# Patient Record
Sex: FEMALE | Race: WHITE | Employment: FULL TIME | URBAN - METROPOLITAN AREA
[De-identification: names, ages, dates, MRNs, and addresses within clinical notes are randomized per-mention and may not be internally consistent; named-entity substitution may affect disease eponyms.]

---

## 2019-12-27 ENCOUNTER — APPOINTMENT (OUTPATIENT)
Dept: CT IMAGING | Age: 69
DRG: 193 | End: 2019-12-27
Attending: EMERGENCY MEDICINE
Payer: COMMERCIAL

## 2019-12-27 ENCOUNTER — APPOINTMENT (OUTPATIENT)
Dept: GENERAL RADIOLOGY | Age: 69
DRG: 193 | End: 2019-12-27
Attending: EMERGENCY MEDICINE
Payer: COMMERCIAL

## 2019-12-27 ENCOUNTER — HOSPITAL ENCOUNTER (INPATIENT)
Age: 69
LOS: 4 days | Discharge: HOME | DRG: 193 | End: 2019-12-31
Attending: EMERGENCY MEDICINE | Admitting: INTERNAL MEDICINE
Payer: COMMERCIAL

## 2019-12-27 DIAGNOSIS — R09.02 HYPOXEMIA: ICD-10-CM

## 2019-12-27 DIAGNOSIS — J18.9 PNEUMONIA OF BOTH LUNGS DUE TO INFECTIOUS ORGANISM, UNSPECIFIED PART OF LUNG: Primary | ICD-10-CM

## 2019-12-27 DIAGNOSIS — C90.00 MULTIPLE MYELOMA NOT HAVING ACHIEVED REMISSION (HCC): ICD-10-CM

## 2019-12-27 DIAGNOSIS — J11.1 INFLUENZA: ICD-10-CM

## 2019-12-27 LAB
ALBUMIN SERPL-MCNC: 2.2 G/DL (ref 3.5–5)
ALBUMIN/GLOB SERPL: 0.3 {RATIO} (ref 1.1–2.2)
ALP SERPL-CCNC: 100 U/L (ref 45–117)
ALT SERPL-CCNC: 60 U/L (ref 12–78)
ANION GAP SERPL CALC-SCNC: 10 MMOL/L (ref 5–15)
AST SERPL-CCNC: 64 U/L (ref 15–37)
BASOPHILS # BLD: 0 K/UL (ref 0–0.1)
BASOPHILS NFR BLD: 0 % (ref 0–1)
BILIRUB SERPL-MCNC: 1.1 MG/DL (ref 0.2–1)
BUN SERPL-MCNC: 19 MG/DL (ref 6–20)
BUN/CREAT SERPL: 17 (ref 12–20)
CALCIUM SERPL-MCNC: 7.4 MG/DL (ref 8.5–10.1)
CHLORIDE SERPL-SCNC: 102 MMOL/L (ref 97–108)
CO2 SERPL-SCNC: 22 MMOL/L (ref 21–32)
COMMENT, HOLDF: NORMAL
CREAT SERPL-MCNC: 1.14 MG/DL (ref 0.55–1.02)
DIFFERENTIAL METHOD BLD: ABNORMAL
EOSINOPHIL # BLD: 0 K/UL (ref 0–0.4)
EOSINOPHIL NFR BLD: 0 % (ref 0–7)
ERYTHROCYTE [DISTWIDTH] IN BLOOD BY AUTOMATED COUNT: 14.5 % (ref 11.5–14.5)
GLOBULIN SER CALC-MCNC: 6.5 G/DL (ref 2–4)
GLUCOSE SERPL-MCNC: 151 MG/DL (ref 65–100)
HCT VFR BLD AUTO: 29.5 % (ref 35–47)
HGB BLD-MCNC: 10 G/DL (ref 11.5–16)
IMM GRANULOCYTES # BLD AUTO: 0 K/UL
IMM GRANULOCYTES NFR BLD AUTO: 0 %
LACTATE BLD-SCNC: 1.47 MMOL/L (ref 0.4–2)
LYMPHOCYTES # BLD: 0.7 K/UL (ref 0.8–3.5)
LYMPHOCYTES NFR BLD: 11 % (ref 12–49)
MCH RBC QN AUTO: 32.5 PG (ref 26–34)
MCHC RBC AUTO-ENTMCNC: 33.9 G/DL (ref 30–36.5)
MCV RBC AUTO: 95.8 FL (ref 80–99)
MONOCYTES # BLD: 0.4 K/UL (ref 0–1)
MONOCYTES NFR BLD: 6 % (ref 5–13)
NEUTS BAND NFR BLD MANUAL: 6 % (ref 0–6)
NEUTS SEG # BLD: 5.3 K/UL (ref 1.8–8)
NEUTS SEG NFR BLD: 77 % (ref 32–75)
NRBC # BLD: 0.03 K/UL (ref 0–0.01)
NRBC BLD-RTO: 0.5 PER 100 WBC
PLATELET # BLD AUTO: 231 K/UL (ref 150–400)
PMV BLD AUTO: 11 FL (ref 8.9–12.9)
POTASSIUM SERPL-SCNC: 3 MMOL/L (ref 3.5–5.1)
PROT SERPL-MCNC: 8.7 G/DL (ref 6.4–8.2)
RBC # BLD AUTO: 3.08 M/UL (ref 3.8–5.2)
RBC MORPH BLD: ABNORMAL
SAMPLES BEING HELD,HOLD: NORMAL
SODIUM SERPL-SCNC: 134 MMOL/L (ref 136–145)
WBC # BLD AUTO: 6.4 K/UL (ref 3.6–11)

## 2019-12-27 PROCEDURE — 77030013140 HC MSK NEB VYRM -A

## 2019-12-27 PROCEDURE — 71045 X-RAY EXAM CHEST 1 VIEW: CPT

## 2019-12-27 PROCEDURE — 99285 EMERGENCY DEPT VISIT HI MDM: CPT

## 2019-12-27 PROCEDURE — 96375 TX/PRO/DX INJ NEW DRUG ADDON: CPT

## 2019-12-27 PROCEDURE — 96374 THER/PROPH/DIAG INJ IV PUSH: CPT

## 2019-12-27 PROCEDURE — 65660000000 HC RM CCU STEPDOWN

## 2019-12-27 PROCEDURE — 80053 COMPREHEN METABOLIC PANEL: CPT

## 2019-12-27 PROCEDURE — 87040 BLOOD CULTURE FOR BACTERIA: CPT

## 2019-12-27 PROCEDURE — 94640 AIRWAY INHALATION TREATMENT: CPT

## 2019-12-27 PROCEDURE — 74011000250 HC RX REV CODE- 250: Performed by: EMERGENCY MEDICINE

## 2019-12-27 PROCEDURE — 74011250637 HC RX REV CODE- 250/637: Performed by: EMERGENCY MEDICINE

## 2019-12-27 PROCEDURE — 71275 CT ANGIOGRAPHY CHEST: CPT

## 2019-12-27 PROCEDURE — 85025 COMPLETE CBC W/AUTO DIFF WBC: CPT

## 2019-12-27 PROCEDURE — 36415 COLL VENOUS BLD VENIPUNCTURE: CPT

## 2019-12-27 PROCEDURE — 93005 ELECTROCARDIOGRAM TRACING: CPT

## 2019-12-27 PROCEDURE — 74011250636 HC RX REV CODE- 250/636: Performed by: EMERGENCY MEDICINE

## 2019-12-27 PROCEDURE — 83605 ASSAY OF LACTIC ACID: CPT

## 2019-12-27 PROCEDURE — 74011636320 HC RX REV CODE- 636/320: Performed by: RADIOLOGY

## 2019-12-27 RX ORDER — LEVOFLOXACIN 5 MG/ML
750 INJECTION, SOLUTION INTRAVENOUS EVERY 24 HOURS
Status: DISCONTINUED | OUTPATIENT
Start: 2019-12-27 | End: 2019-12-28 | Stop reason: SDUPTHER

## 2019-12-27 RX ORDER — LENALIDOMIDE 25 MG/1
25 CAPSULE ORAL
COMMUNITY

## 2019-12-27 RX ORDER — ASPIRIN 81 MG/1
81 TABLET ORAL DAILY
COMMUNITY

## 2019-12-27 RX ORDER — CALCIUM CARBONATE/VITAMIN D3 250-3.125
1 TABLET ORAL 2 TIMES DAILY
COMMUNITY

## 2019-12-27 RX ORDER — ALBUTEROL SULFATE 0.83 MG/ML
5 SOLUTION RESPIRATORY (INHALATION)
Status: COMPLETED | OUTPATIENT
Start: 2019-12-27 | End: 2019-12-27

## 2019-12-27 RX ORDER — ALLOPURINOL 100 MG/1
100 TABLET ORAL DAILY
COMMUNITY

## 2019-12-27 RX ORDER — ACETAMINOPHEN 500 MG
1000 TABLET ORAL ONCE
Status: COMPLETED | OUTPATIENT
Start: 2019-12-27 | End: 2019-12-27

## 2019-12-27 RX ORDER — DEXAMETHASONE 4 MG/1
20 TABLET ORAL
COMMUNITY

## 2019-12-27 RX ORDER — ACYCLOVIR 400 MG/1
400 TABLET ORAL 2 TIMES DAILY
COMMUNITY

## 2019-12-27 RX ORDER — SODIUM CHLORIDE 0.9 % (FLUSH) 0.9 %
5-10 SYRINGE (ML) INJECTION AS NEEDED
Status: DISCONTINUED | OUTPATIENT
Start: 2019-12-27 | End: 2019-12-31 | Stop reason: HOSPADM

## 2019-12-27 RX ORDER — OMEPRAZOLE 20 MG/1
20 CAPSULE, DELAYED RELEASE ORAL
COMMUNITY

## 2019-12-27 RX ORDER — IPRATROPIUM BROMIDE AND ALBUTEROL SULFATE 2.5; .5 MG/3ML; MG/3ML
3 SOLUTION RESPIRATORY (INHALATION)
Status: COMPLETED | OUTPATIENT
Start: 2019-12-27 | End: 2019-12-27

## 2019-12-27 RX ADMIN — METHYLPREDNISOLONE SODIUM SUCCINATE 125 MG: 125 INJECTION, POWDER, FOR SOLUTION INTRAMUSCULAR; INTRAVENOUS at 19:30

## 2019-12-27 RX ADMIN — IOPAMIDOL 75 ML: 755 INJECTION, SOLUTION INTRAVENOUS at 18:39

## 2019-12-27 RX ADMIN — ALBUTEROL SULFATE 5 MG: 2.5 SOLUTION RESPIRATORY (INHALATION) at 19:35

## 2019-12-27 RX ADMIN — ACETAMINOPHEN 1000 MG: 500 TABLET ORAL at 16:05

## 2019-12-27 RX ADMIN — SODIUM CHLORIDE 1000 ML: 900 INJECTION, SOLUTION INTRAVENOUS at 18:05

## 2019-12-27 RX ADMIN — LEVOFLOXACIN 750 MG: 5 INJECTION, SOLUTION INTRAVENOUS at 19:43

## 2019-12-27 RX ADMIN — SODIUM CHLORIDE 1000 ML: 900 INJECTION, SOLUTION INTRAVENOUS at 16:06

## 2019-12-27 RX ADMIN — IPRATROPIUM BROMIDE AND ALBUTEROL SULFATE 3 ML: .5; 3 SOLUTION RESPIRATORY (INHALATION) at 16:05

## 2019-12-27 NOTE — ED TRIAGE NOTES
Arrival via EMS, bearing down to have a BM and passed out. A/O x 4 when EMS arrived. Over last couple of weeks running fever, feeling like she is coming down with something. Receiving chemo for multiple myeloma. Shortness of breath noted upon arrival here, pulse ox 90% on room air. Per EMS 92% on room air up to 98% on 4 L/NC.

## 2019-12-28 ENCOUNTER — APPOINTMENT (OUTPATIENT)
Dept: NON INVASIVE DIAGNOSTICS | Age: 69
DRG: 193 | End: 2019-12-28
Attending: INTERNAL MEDICINE
Payer: COMMERCIAL

## 2019-12-28 PROBLEM — D64.9 ANEMIA: Status: ACTIVE | Noted: 2019-12-28

## 2019-12-28 PROBLEM — R09.02 HYPOXEMIA: Status: ACTIVE | Noted: 2019-12-28

## 2019-12-28 PROBLEM — E88.09 HYPOALBUMINEMIA: Status: ACTIVE | Noted: 2019-12-28

## 2019-12-28 PROBLEM — E66.9 OBESE: Status: ACTIVE | Noted: 2019-12-28

## 2019-12-28 PROBLEM — I95.9 HYPOTENSION: Status: ACTIVE | Noted: 2019-12-28

## 2019-12-28 PROBLEM — J18.9 BILATERAL PNEUMONIA: Status: RESOLVED | Noted: 2019-12-27 | Resolved: 2019-12-28

## 2019-12-28 PROBLEM — R93.89 ABNORMAL X-RAY: Status: ACTIVE | Noted: 2019-12-28

## 2019-12-28 PROBLEM — R79.89 LOW TSH LEVEL: Status: ACTIVE | Noted: 2019-12-28

## 2019-12-28 PROBLEM — R55 SYNCOPE: Status: ACTIVE | Noted: 2019-12-28

## 2019-12-28 LAB
ALBUMIN SERPL-MCNC: 2.1 G/DL (ref 3.5–5)
ALBUMIN/GLOB SERPL: 0.4 {RATIO} (ref 1.1–2.2)
ALP SERPL-CCNC: 82 U/L (ref 45–117)
ALT SERPL-CCNC: 53 U/L (ref 12–78)
ANION GAP SERPL CALC-SCNC: 10 MMOL/L (ref 5–15)
APPEARANCE UR: CLEAR
AST SERPL-CCNC: 43 U/L (ref 15–37)
B PERT DNA SPEC QL NAA+PROBE: NOT DETECTED
BACTERIA URNS QL MICRO: ABNORMAL /HPF
BASOPHILS # BLD: 0 K/UL (ref 0–0.1)
BASOPHILS NFR BLD: 0 % (ref 0–1)
BILIRUB SERPL-MCNC: 0.6 MG/DL (ref 0.2–1)
BILIRUB UR QL: NEGATIVE
BORDETELLA PARAPERTUSSIS PCR, BORPAR: NOT DETECTED
BUN SERPL-MCNC: 18 MG/DL (ref 6–20)
BUN/CREAT SERPL: 19 (ref 12–20)
C PNEUM DNA SPEC QL NAA+PROBE: NOT DETECTED
CALCIUM SERPL-MCNC: 6.6 MG/DL (ref 8.5–10.1)
CHLORIDE SERPL-SCNC: 106 MMOL/L (ref 97–108)
CO2 SERPL-SCNC: 19 MMOL/L (ref 21–32)
COLOR UR: ABNORMAL
CREAT SERPL-MCNC: 0.96 MG/DL (ref 0.55–1.02)
CREAT UR-MCNC: 86.8 MG/DL
DIFFERENTIAL METHOD BLD: ABNORMAL
ECHO AO ASC DIAM: 3.38 CM
ECHO AO ROOT DIAM: 2.69 CM
ECHO IVC SNIFF: 2.36 CM
ECHO LA AREA 4C: 26.4 CM2
ECHO LA MAJOR AXIS: 4.86 CM
ECHO LA TO AORTIC ROOT RATIO: 1.8
ECHO LA VOL 2C: 79.69 ML (ref 22–52)
ECHO LA VOL 4C: 83.7 ML (ref 22–52)
ECHO LA VOL BP: 83.1 ML (ref 22–52)
ECHO LA VOL/BSA BIPLANE: 40.67 ML/M2 (ref 16–28)
ECHO LA VOLUME INDEX A2C: 39 ML/M2 (ref 16–28)
ECHO LA VOLUME INDEX A4C: 40.96 ML/M2 (ref 16–28)
ECHO LV E' LATERAL VELOCITY: 9.51 CENTIMETER/SECOND
ECHO LV E' SEPTAL VELOCITY: 7.53 CENTIMETER/SECOND
ECHO LV EDV TEICHHOLZ: 0.8 ML
ECHO LV ESV TEICHHOLZ: 0.34 ML
ECHO LV INTERNAL DIMENSION DIASTOLIC: 5.3 CM (ref 3.9–5.3)
ECHO LV INTERNAL DIMENSION SYSTOLIC: 3.71 CM
ECHO LV IVSD: 1.06 CM (ref 0.6–0.9)
ECHO LV MASS 2D: 283.9 G (ref 67–162)
ECHO LV MASS INDEX 2D: 138.9 G/M2 (ref 43–95)
ECHO LV POSTERIOR WALL DIASTOLIC: 1.22 CM (ref 0.6–0.9)
ECHO LVOT DIAM: 1.89 CM
ECHO MV A VELOCITY: 79.4 CM/S
ECHO MV AREA PHT: 4.9 CM2
ECHO MV E DECELERATION TIME (DT): 155.1 MS
ECHO MV E VELOCITY: 115.21 CM/S
ECHO MV E/A RATIO: 1.45
ECHO MV PRESSURE HALF TIME (PHT): 45 MS
ECHO RA AREA 4C: 16.95 CM2
ECHO RV INTERNAL DIMENSION: 4.14 CM
ECHO RV TAPSE: 2.5 CM (ref 1.5–2)
EOSINOPHIL # BLD: 0 K/UL (ref 0–0.4)
EOSINOPHIL #/AREA URNS HPF: NEGATIVE /[HPF]
EOSINOPHIL NFR BLD: 0 % (ref 0–7)
EPITH CASTS URNS QL MICRO: ABNORMAL /LPF
ERYTHROCYTE [DISTWIDTH] IN BLOOD BY AUTOMATED COUNT: 14.6 % (ref 11.5–14.5)
FERRITIN SERPL-MCNC: 2101 NG/ML (ref 8–252)
FLUAV H1 2009 PAND RNA SPEC QL NAA+PROBE: DETECTED
FLUAV H1 RNA SPEC QL NAA+PROBE: NOT DETECTED
FLUAV H3 RNA SPEC QL NAA+PROBE: NOT DETECTED
FLUAV SUBTYP SPEC NAA+PROBE: NOT DETECTED
FLUBV RNA SPEC QL NAA+PROBE: NOT DETECTED
FOLATE SERPL-MCNC: 19.9 NG/ML (ref 5–21)
GLOBULIN SER CALC-MCNC: 5.4 G/DL (ref 2–4)
GLUCOSE SERPL-MCNC: 239 MG/DL (ref 65–100)
GLUCOSE UR STRIP.AUTO-MCNC: NEGATIVE MG/DL
HADV DNA SPEC QL NAA+PROBE: NOT DETECTED
HAPTOGLOB SERPL-MCNC: 357 MG/DL (ref 30–200)
HCOV 229E RNA SPEC QL NAA+PROBE: NOT DETECTED
HCOV HKU1 RNA SPEC QL NAA+PROBE: NOT DETECTED
HCOV NL63 RNA SPEC QL NAA+PROBE: NOT DETECTED
HCOV OC43 RNA SPEC QL NAA+PROBE: NOT DETECTED
HCT VFR BLD AUTO: 26.7 % (ref 35–47)
HGB BLD-MCNC: 9.1 G/DL (ref 11.5–16)
HGB UR QL STRIP: NEGATIVE
HMPV RNA SPEC QL NAA+PROBE: NOT DETECTED
HPIV1 RNA SPEC QL NAA+PROBE: NOT DETECTED
HPIV2 RNA SPEC QL NAA+PROBE: NOT DETECTED
HPIV3 RNA SPEC QL NAA+PROBE: NOT DETECTED
HPIV4 RNA SPEC QL NAA+PROBE: NOT DETECTED
HYALINE CASTS URNS QL MICRO: ABNORMAL /LPF (ref 0–5)
IMM GRANULOCYTES # BLD AUTO: 0 K/UL
IMM GRANULOCYTES NFR BLD AUTO: 0 %
IRON SATN MFR SERPL: 8 % (ref 20–50)
IRON SERPL-MCNC: 14 UG/DL (ref 35–150)
KETONES UR QL STRIP.AUTO: NEGATIVE MG/DL
LDH SERPL L TO P-CCNC: 400 U/L (ref 81–246)
LEUKOCYTE ESTERASE UR QL STRIP.AUTO: ABNORMAL
LVFS 2D: 30.02 %
LVSV (TEICH): 37.04 ML
LYMPHOCYTES # BLD: 0.1 K/UL (ref 0.8–3.5)
LYMPHOCYTES NFR BLD: 3 % (ref 12–49)
M PNEUMO DNA SPEC QL NAA+PROBE: NOT DETECTED
MCH RBC QN AUTO: 32.4 PG (ref 26–34)
MCHC RBC AUTO-ENTMCNC: 34.1 G/DL (ref 30–36.5)
MCV RBC AUTO: 95 FL (ref 80–99)
METAMYELOCYTES NFR BLD MANUAL: 1 %
MONOCYTES # BLD: 0.1 K/UL (ref 0–1)
MONOCYTES NFR BLD: 2 % (ref 5–13)
MV DEC SLOPE: 7.43
NEUTS SEG # BLD: 3.7 K/UL (ref 1.8–8)
NEUTS SEG NFR BLD: 94 % (ref 32–75)
NITRITE UR QL STRIP.AUTO: NEGATIVE
NRBC # BLD: 0 K/UL (ref 0–0.01)
NRBC BLD-RTO: 0 PER 100 WBC
OSMOLALITY UR: 534 MOSM/KG H2O
PH UR STRIP: 5.5 [PH] (ref 5–8)
PLATELET # BLD AUTO: 207 K/UL (ref 150–400)
PMV BLD AUTO: 10.7 FL (ref 8.9–12.9)
POTASSIUM SERPL-SCNC: 3.2 MMOL/L (ref 3.5–5.1)
PROCALCITONIN SERPL-MCNC: 0.75 NG/ML
PROT SERPL-MCNC: 7.5 G/DL (ref 6.4–8.2)
PROT UR STRIP-MCNC: 30 MG/DL
PROT UR-MCNC: 95 MG/DL (ref 0–11.9)
RBC # BLD AUTO: 2.81 M/UL (ref 3.8–5.2)
RBC #/AREA URNS HPF: ABNORMAL /HPF (ref 0–5)
RBC MORPH BLD: ABNORMAL
RETICS # AUTO: 0.03 M/UL (ref 0.02–0.08)
RETICS/RBC NFR AUTO: 1.2 % (ref 0.7–2.1)
RSV RNA SPEC QL NAA+PROBE: NOT DETECTED
RV+EV RNA SPEC QL NAA+PROBE: NOT DETECTED
SODIUM SERPL-SCNC: 135 MMOL/L (ref 136–145)
SODIUM UR-SCNC: 18 MMOL/L
SP GR UR REFRACTOMETRY: 1.03 (ref 1–1.03)
T4 SERPL-MCNC: 10.1 UG/DL (ref 4.8–13.9)
TIBC SERPL-MCNC: 184 UG/DL (ref 250–450)
TROPONIN I SERPL-MCNC: <0.05 NG/ML
TROPONIN I SERPL-MCNC: <0.05 NG/ML
TSH SERPL DL<=0.05 MIU/L-ACNC: 0.04 UIU/ML (ref 0.36–3.74)
UR CULT HOLD, URHOLD: NORMAL
UROBILINOGEN UR QL STRIP.AUTO: 0.2 EU/DL (ref 0.2–1)
VIT B12 SERPL-MCNC: 724 PG/ML (ref 193–986)
WBC # BLD AUTO: 3.9 K/UL (ref 3.6–11)
WBC URNS QL MICRO: ABNORMAL /HPF (ref 0–4)

## 2019-12-28 PROCEDURE — 83540 ASSAY OF IRON: CPT

## 2019-12-28 PROCEDURE — 0099U RESPIRATORY PANEL,PCR,NASOPHARYNGEAL: CPT

## 2019-12-28 PROCEDURE — 87205 SMEAR GRAM STAIN: CPT

## 2019-12-28 PROCEDURE — 36415 COLL VENOUS BLD VENIPUNCTURE: CPT

## 2019-12-28 PROCEDURE — 85025 COMPLETE CBC W/AUTO DIFF WBC: CPT

## 2019-12-28 PROCEDURE — 77030038269 HC DRN EXT URIN PURWCK BARD -A

## 2019-12-28 PROCEDURE — 77030029684 HC NEB SM VOL KT MONA -A

## 2019-12-28 PROCEDURE — 81001 URINALYSIS AUTO W/SCOPE: CPT

## 2019-12-28 PROCEDURE — 83615 LACTATE (LD) (LDH) ENZYME: CPT

## 2019-12-28 PROCEDURE — 82746 ASSAY OF FOLIC ACID SERUM: CPT

## 2019-12-28 PROCEDURE — 84436 ASSAY OF TOTAL THYROXINE: CPT

## 2019-12-28 PROCEDURE — 84484 ASSAY OF TROPONIN QUANT: CPT

## 2019-12-28 PROCEDURE — 87899 AGENT NOS ASSAY W/OPTIC: CPT

## 2019-12-28 PROCEDURE — 74011000258 HC RX REV CODE- 258: Performed by: INTERNAL MEDICINE

## 2019-12-28 PROCEDURE — 82728 ASSAY OF FERRITIN: CPT

## 2019-12-28 PROCEDURE — 84443 ASSAY THYROID STIM HORMONE: CPT

## 2019-12-28 PROCEDURE — 94760 N-INVAS EAR/PLS OXIMETRY 1: CPT

## 2019-12-28 PROCEDURE — 83010 ASSAY OF HAPTOGLOBIN QUANT: CPT

## 2019-12-28 PROCEDURE — 65660000000 HC RM CCU STEPDOWN

## 2019-12-28 PROCEDURE — 84145 PROCALCITONIN (PCT): CPT

## 2019-12-28 PROCEDURE — 94640 AIRWAY INHALATION TREATMENT: CPT

## 2019-12-28 PROCEDURE — 84156 ASSAY OF PROTEIN URINE: CPT

## 2019-12-28 PROCEDURE — 74011000250 HC RX REV CODE- 250: Performed by: INTERNAL MEDICINE

## 2019-12-28 PROCEDURE — 77010033678 HC OXYGEN DAILY

## 2019-12-28 PROCEDURE — 82570 ASSAY OF URINE CREATININE: CPT

## 2019-12-28 PROCEDURE — 74011250636 HC RX REV CODE- 250/636: Performed by: INTERNAL MEDICINE

## 2019-12-28 PROCEDURE — 80053 COMPREHEN METABOLIC PANEL: CPT

## 2019-12-28 PROCEDURE — 85045 AUTOMATED RETICULOCYTE COUNT: CPT

## 2019-12-28 PROCEDURE — 84300 ASSAY OF URINE SODIUM: CPT

## 2019-12-28 PROCEDURE — 83935 ASSAY OF URINE OSMOLALITY: CPT

## 2019-12-28 PROCEDURE — 82607 VITAMIN B-12: CPT

## 2019-12-28 PROCEDURE — 74011250637 HC RX REV CODE- 250/637: Performed by: INTERNAL MEDICINE

## 2019-12-28 PROCEDURE — 87449 NOS EACH ORGANISM AG IA: CPT

## 2019-12-28 PROCEDURE — 93306 TTE W/DOPPLER COMPLETE: CPT

## 2019-12-28 PROCEDURE — 84480 ASSAY TRIIODOTHYRONINE (T3): CPT

## 2019-12-28 RX ORDER — POTASSIUM CHLORIDE AND SODIUM CHLORIDE 900; 300 MG/100ML; MG/100ML
INJECTION, SOLUTION INTRAVENOUS CONTINUOUS
Status: DISCONTINUED | OUTPATIENT
Start: 2019-12-28 | End: 2019-12-30

## 2019-12-28 RX ORDER — MORPHINE SULFATE 4 MG/ML
5 INJECTION INTRAVENOUS
Status: DISCONTINUED | OUTPATIENT
Start: 2019-12-28 | End: 2019-12-31 | Stop reason: HOSPADM

## 2019-12-28 RX ORDER — ALLOPURINOL 100 MG/1
100 TABLET ORAL DAILY
Status: DISCONTINUED | OUTPATIENT
Start: 2019-12-28 | End: 2019-12-31 | Stop reason: HOSPADM

## 2019-12-28 RX ORDER — DIPHENHYDRAMINE HCL 25 MG
25 CAPSULE ORAL
Status: DISCONTINUED | OUTPATIENT
Start: 2019-12-28 | End: 2019-12-31 | Stop reason: HOSPADM

## 2019-12-28 RX ORDER — OSELTAMIVIR PHOSPHATE 75 MG/1
75 CAPSULE ORAL 2 TIMES DAILY
Status: DISCONTINUED | OUTPATIENT
Start: 2019-12-28 | End: 2019-12-31 | Stop reason: HOSPADM

## 2019-12-28 RX ORDER — ACETAMINOPHEN 325 MG/1
650 TABLET ORAL
Status: DISCONTINUED | OUTPATIENT
Start: 2019-12-28 | End: 2019-12-31 | Stop reason: HOSPADM

## 2019-12-28 RX ORDER — ASPIRIN 81 MG/1
81 TABLET ORAL DAILY
Status: DISCONTINUED | OUTPATIENT
Start: 2019-12-28 | End: 2019-12-31 | Stop reason: HOSPADM

## 2019-12-28 RX ORDER — IPRATROPIUM BROMIDE AND ALBUTEROL SULFATE 2.5; .5 MG/3ML; MG/3ML
3 SOLUTION RESPIRATORY (INHALATION)
Status: DISCONTINUED | OUTPATIENT
Start: 2019-12-28 | End: 2019-12-28

## 2019-12-28 RX ORDER — PANTOPRAZOLE SODIUM 40 MG/1
40 TABLET, DELAYED RELEASE ORAL
Status: DISCONTINUED | OUTPATIENT
Start: 2019-12-28 | End: 2019-12-31 | Stop reason: HOSPADM

## 2019-12-28 RX ORDER — SODIUM CHLORIDE 0.9 % (FLUSH) 0.9 %
5-40 SYRINGE (ML) INJECTION EVERY 8 HOURS
Status: DISCONTINUED | OUTPATIENT
Start: 2019-12-28 | End: 2019-12-31 | Stop reason: HOSPADM

## 2019-12-28 RX ORDER — ENOXAPARIN SODIUM 100 MG/ML
40 INJECTION SUBCUTANEOUS EVERY 24 HOURS
Status: DISCONTINUED | OUTPATIENT
Start: 2019-12-28 | End: 2019-12-31 | Stop reason: HOSPADM

## 2019-12-28 RX ORDER — ONDANSETRON 2 MG/ML
4 INJECTION INTRAMUSCULAR; INTRAVENOUS
Status: DISCONTINUED | OUTPATIENT
Start: 2019-12-28 | End: 2019-12-31 | Stop reason: HOSPADM

## 2019-12-28 RX ORDER — DEXTROSE MONOHYDRATE AND SODIUM CHLORIDE 5; .45 G/100ML; G/100ML
100 INJECTION, SOLUTION INTRAVENOUS CONTINUOUS
Status: DISCONTINUED | OUTPATIENT
Start: 2019-12-28 | End: 2019-12-28

## 2019-12-28 RX ORDER — ENOXAPARIN SODIUM 100 MG/ML
40 INJECTION SUBCUTANEOUS EVERY 24 HOURS
Status: DISCONTINUED | OUTPATIENT
Start: 2019-12-28 | End: 2019-12-28

## 2019-12-28 RX ORDER — SODIUM CHLORIDE 0.9 % (FLUSH) 0.9 %
5-40 SYRINGE (ML) INJECTION AS NEEDED
Status: DISCONTINUED | OUTPATIENT
Start: 2019-12-28 | End: 2019-12-31 | Stop reason: HOSPADM

## 2019-12-28 RX ORDER — LEVOFLOXACIN 5 MG/ML
750 INJECTION, SOLUTION INTRAVENOUS EVERY 24 HOURS
Status: DISCONTINUED | OUTPATIENT
Start: 2019-12-28 | End: 2019-12-28

## 2019-12-28 RX ADMIN — DEXTROSE MONOHYDRATE AND SODIUM CHLORIDE 100 ML/HR: 5; .45 INJECTION, SOLUTION INTRAVENOUS at 00:41

## 2019-12-28 RX ADMIN — ASPIRIN 81 MG: 81 TABLET, COATED ORAL at 09:02

## 2019-12-28 RX ADMIN — ACETAMINOPHEN 650 MG: 325 TABLET ORAL at 19:08

## 2019-12-28 RX ADMIN — Medication 10 ML: at 00:41

## 2019-12-28 RX ADMIN — ACETAMINOPHEN 650 MG: 325 TABLET ORAL at 14:56

## 2019-12-28 RX ADMIN — ENOXAPARIN SODIUM 40 MG: 40 INJECTION SUBCUTANEOUS at 09:02

## 2019-12-28 RX ADMIN — OSELTAMIVIR PHOSPHATE 75 MG: 75 CAPSULE ORAL at 14:05

## 2019-12-28 RX ADMIN — IPRATROPIUM BROMIDE AND ALBUTEROL SULFATE 3 ML: .5; 3 SOLUTION RESPIRATORY (INHALATION) at 01:52

## 2019-12-28 RX ADMIN — PANTOPRAZOLE SODIUM 40 MG: 40 TABLET, DELAYED RELEASE ORAL at 09:02

## 2019-12-28 RX ADMIN — SODIUM CHLORIDE AND POTASSIUM CHLORIDE: 9; 2.98 INJECTION, SOLUTION INTRAVENOUS at 09:02

## 2019-12-28 RX ADMIN — Medication 10 ML: at 03:14

## 2019-12-28 RX ADMIN — Medication 10 ML: at 14:58

## 2019-12-28 RX ADMIN — ACETAMINOPHEN 650 MG: 325 TABLET ORAL at 10:42

## 2019-12-28 RX ADMIN — METHYLPREDNISOLONE SODIUM SUCCINATE 40 MG: 40 INJECTION, POWDER, FOR SOLUTION INTRAMUSCULAR; INTRAVENOUS at 03:13

## 2019-12-28 RX ADMIN — SODIUM CHLORIDE AND POTASSIUM CHLORIDE: 9; 2.98 INJECTION, SOLUTION INTRAVENOUS at 18:26

## 2019-12-28 RX ADMIN — ACETAMINOPHEN 650 MG: 325 TABLET ORAL at 23:48

## 2019-12-28 RX ADMIN — ALLOPURINOL 100 MG: 100 TABLET ORAL at 09:02

## 2019-12-28 RX ADMIN — Medication 10 ML: at 22:03

## 2019-12-28 RX ADMIN — IPRATROPIUM BROMIDE AND ALBUTEROL SULFATE 3 ML: .5; 3 SOLUTION RESPIRATORY (INHALATION) at 07:25

## 2019-12-28 NOTE — PROGRESS NOTES
TRANSFER - IN REPORT:    Verbal report received from AltaRN(name) on Berto Barrett  being received from ER(unit) for routine progression of care      Report consisted of patients Situation, Background, Assessment and   Recommendations(SBAR). Information from the following report(s) SBAR and Kardex was reviewed with the receiving nurse. Opportunity for questions and clarification was provided. Assessment completed upon patients arrival to unit and care assumed.

## 2019-12-28 NOTE — PROGRESS NOTES
Admission Medication Reconciliation:    Comments/Recommendations:  -Medication history obtained in the emergency department (room 06)  -PTA medication had never been reviewed prior to med rec  -Revlimid - Patient takes daily for 2 weeks on followed by 1 week off, currently in off week that started on 12/25. Patient can supply Revlimid if still admitted on 1/1/2020 when next dose would be due. Takes aspirin 81 mg daily. Dexamethasone 20 mg every Monday. Velcade twice a week for 2 weeks on followed by 1 week off, currently in off week that started on 12/25. Xgeva once a month. Medications added: All medications below were added  Medications removed: None  Medications changed: None    Information obtained from: Patient, RxQuery, chart review    Significant PMH/Disease States:   No past medical history on file. Chief Complaint for this Admission:    Chief Complaint   Patient presents with    Fever    Dizziness    Shortness of Breath       Allergies:  Bactrim [sulfamethoprim] and Pcn [penicillins]    Prior to Admission Medications:   Prior to Admission Medications   Prescriptions Last Dose Informant Patient Reported? Taking?   acyclovir (ZOVIRAX) 400 mg tablet 12/27/2019 at AM  Yes Yes   Sig: Take 400 mg by mouth two (2) times a day. allopurinol (ZYLOPRIM) 100 mg tablet 12/27/2019 at AM  Yes Yes   Sig: Take 100 mg by mouth daily. aspirin delayed-release 81 mg tablet 12/27/2019 at AM  Yes Yes   Sig: Take 81 mg by mouth daily. bortezomib (VELCADE INJECTION)   Yes Yes   Sig: by Injection route. Two times per week for 2 weeks followed by 1 week off   calcium-vitamin D (OYSTER SHELL CALCIUM-VIT D3) 250-125 mg-unit tablet 12/27/2019 at AM  Yes Yes   Sig: Take 1 Tab by mouth two (2) times a day. denosumab (XGEVA) soln injection   Yes Yes   Sig: by SubCUTAneous route every month. dexAMETHasone (DECADRON) 4 mg tablet 12/23/2019 at AM  Yes Yes   Sig: Take 20 mg by mouth every Monday.    lenalidomide (REVLIMID) 25 mg cap 12/24/2019  Yes Yes   Sig: Take 25 mg by mouth. Revlimid 25 mg PO daily for 2 weeks on followed by 1 week off   omeprazole (PRILOSEC) 20 mg capsule 12/27/2019 at AM  Yes Yes   Sig: Take 20 mg by mouth Daily (before breakfast).       Facility-Administered Medications: None       Thank you,  Russell Silverman, PHARMD

## 2019-12-28 NOTE — PROGRESS NOTES
12/28/2019  3:43 PM  Case management note    Reason for Admission:   Multiple meyloma    Patient lives in [place] and was here for holiday and had a syncopal episode. Patient lives with spouse in [facility] with elevator. She was recently diagnosed with multiple myeloma. She had taken one treatment before travel. [place] [facility]. She will return to [facility] home for a day or so, before traveling to [place]. They may drive back as they flew here. RRAT Score:          8           Plan for utilizing home health:      n/a                    Current Advanced Directive/Advance Care Plan: AD not on file                         Transition of Care Plan:               1. Home with family assistance  2. Continue treatment once back to [place]  3.  CM to follow until discharge    Care Management Interventions  PCP Verified by CM: Yes(has pcp in [place])  Mode of Transport at Discharge: Self  Transition of Care Consult (CM Consult): Discharge Planning  Current Support Network: Lives with Spouse  Confirm Follow Up Transport: Family  The Plan for Transition of Care is Related to the Following Treatment Goals : multiple myeloma   Discharge Location  Discharge Placement: Home with family assistance  Julián Nguyen

## 2019-12-28 NOTE — PROGRESS NOTES
Problem: Falls - Risk of  Goal: *Absence of Falls  Outcome: Progressing Towards Goal  Note: Fall Risk Interventions:  Mobility Interventions: Bed/chair exit alarm, Communicate number of staff needed for ambulation/transfer, Patient to call before getting OOB              Elimination Interventions: Bed/chair exit alarm, Call light in reach, Stay With Me (per policy)

## 2019-12-28 NOTE — H&P
[address] [facility]  Hospitalist Group  History and Physical    Primary Care Provider: Other, MD Ottoniel    Subjective:     Mariel Lindsay is a 68 y.o. female who was diagnosed with multiple myeloma 3 weeks ago and started on her first cycle of chemotherapy last week came to the ED after syncopal episode. Patient reported that she was having low-grade fever, dry cough and congestion which started 3 days ago. This evening patient was in the restroom where she felt lightheaded and passed out for which EMS was called and brought to the ED. Patient had no similar episode in the past.  She denied any chest pain, palpitation, nausea, vomiting. On arrival to the ED patient had a blood pressure of 82/38 which responded well with IV fluids. Review of Systems:  12 point review of systems was done and found to be negative except the one mentioned in the HPI       No past medical history on file. No past surgical history on file. Prior to Admission medications    Not on File     Allergies   Allergen Reactions    Bactrim [Sulfamethoprim] Myalgia    Pcn [Penicillins] Swelling      No family history on file. SOCIAL HISTORY:  Patient resides at  home  Patient ambulates  independently  Smoking history: None  Alcohol history: No        Objective:       Physical Exam:   Physical Exam  Constitutional:       Appearance: Normal appearance. HENT:      Head: Normocephalic and atraumatic. Nose: No congestion or rhinorrhea. Mouth/Throat:      Pharynx: No oropharyngeal exudate or posterior oropharyngeal erythema. Eyes:      General: No scleral icterus. Right eye: No discharge. Left eye: No discharge. Extraocular Movements: Extraocular movements intact. Pupils: Pupils are equal, round, and reactive to light. Neck:      Musculoskeletal: Normal range of motion and neck supple. Cardiovascular:      Rate and Rhythm: Normal rate and regular rhythm.    Pulmonary:      Effort: Pulmonary effort is normal.      Breath sounds: No stridor. Wheezing (All over the chest) present. No rhonchi. Chest:      Chest wall: No tenderness. Abdominal:      General: Abdomen is flat. There is no distension. Palpations: There is no mass. Tenderness: There is no tenderness. Hernia: No hernia is present. Musculoskeletal: Normal range of motion. Skin:     General: Skin is warm and dry. Neurological:      Mental Status: She is alert and oriented to person, place, and time. Cranial Nerves: No cranial nerve deficit. Sensory: No sensory deficit. Motor: No weakness. Coordination: Coordination normal.   Psychiatric:         Mood and Affect: Mood normal.         Behavior: Behavior normal.       Cap refill: Brisk, less than 3 seconds  Pulses: 2+, symmetric in all extremities      Data Review: All diagnostic labs and studies have been reviewed. CT chest  . No evidence pulmonary embolism. 2. Very small bilateral pleural effusions. 3. Bilateral interstitial, nodular and patchy airspace disease, predominantly in  a perihilar distribution. 4. Ill-defined mediastinal and hilar soft tissue, possibly representing reactive  lymph nodes. 5. T8 and T11 compression deformities, as described above. MR can be performed  for further evaluation, as indicated. Assessment:       #Bilateral pneumonia   -Likely viral  -CT chest showed bilateral interstitial nodular and patchy airspace disease  -Continue empiric IV Levaquin, follow blood and respiratory culture  -Legionella urine antigen test  -DuoNebs every 6 hours for wheezing  - Low-dose Solu-Medrol   -Supplemental O2 by NC and monitor O2 sat    #Syncope   -Likely vasovagal  -Get orthostatic vitals  -Telemetry monitoring, 2D echo  -Serial troponin    #Hypokalemia  -Monitor and correct electrolytes    #Anemia  - monitor CBC.  Currently stable     #Multiple myeloma  -Had her first chemotherapy last week  -Continue follow-up with oncologist in [place]    #T8/T11 compression fracture  -Likely pathologic fractures in the setting of multiple myeloma  -No neurologic deficits or pain  - PT/OT               FUNCTIONAL STATUS PRIOR TO HOSPITALIZATION (including history of recent falls):    Signed By: Jannie Huang MD     December 27, 2019

## 2019-12-28 NOTE — PROGRESS NOTES
[facility]    Medical Progress Note      NAME: Keegan Patel   :  1950  MRM:  [id number]    Date/Time: 2019  12:21 PM          Assessment and Plan:     Syncope / Hypotension - POA, vasovagal, due to poor PO intake for 9 days. Hydrate. Check orthostatics. Multiple myeloma / Anemia - POA, just started chemo. Consult oncology. Anemia worsened after hydration ans expected. Influenza A / Abormal x-ray / Hypoxemia - Flu A positive. Start tamiflu. This explains CT findings. Follow other serologies and cx. No cough sputum or dyspnea to suggest PNA. No Abx. Check 6 minute walk prior to DC    Low TSH level - TSH 0.06. Check T4 and T3. Unclear relationship to MM or chemo, but could be due to Flu. May explain tachycardia. May need methimazole. Dehydration / hyponatremia / hypokalemia - POA due to poor PO intake. Hydrate and replete lytes. Hyperglycemia - Noted on labs. No Hx of DM. May have DM. PCP to follow. Obese - Monitor weight during chemo    Gout - No symptoms on allopurinol    GERD (gastroesophageal reflux disease) - PPI    Hypoalbuminemia - Likely related to cancer, chemo and anorexia. Nutrition supplements and consult. Subjective:     Chief Complaint:  Feels better, a bit weak    ROS:  (bold if positive, if negative)    Tolerating some PT  Tolerating some Diet        Objective:     Last 24hrs VS reviewed since prior progress note.  Most recent are:    Visit Vitals  /78 (BP 1 Location: Left arm, BP Patient Position: At rest)   Pulse 94   Temp 99.9 °F (37.7 °C)   Resp 22   Ht 5' 7\" (1.702 m)   Wt 92.1 kg (203 lb)   SpO2 95%   BMI 31.79 kg/m²     SpO2 Readings from Last 6 Encounters:   19 95%    O2 Flow Rate (L/min): 3 l/min       Intake/Output Summary (Last 24 hours) at 2019 1221  Last data filed at 2019 7199  Gross per 24 hour   Intake 2930 ml   Output    Net 2930 ml        Physical Exam:    Gen:  Well-developed, well-nourished, in no acute distress  HEENT:  Pink conjunctivae, PERRL, hearing intact to voice, moist mucous membranes  Neck:  Supple, without masses, thyroid non-tender  Resp:  No accessory muscle use, clear breath sounds without wheezes rales or rhonchi  Card:  No murmurs, tachycardic S1, S2 without thrills, bruits or peripheral edema  Abd:  Soft, non-tender, non-distended, normoactive bowel sounds are present, no mass  Lymph:  No cervical or inguinal adenopathy  Musc:  No cyanosis or clubbing  Skin:  No rashes or ulcers, skin turgor is good  Neuro:  Cranial nerves are grossly intact, mild motor weakness, follows commands appropriately  Psych:  Good insight, oriented to person, place and time, alert    Telemetry reviewed:   normal sinus rhythm  __________________________________________________________________  Medications Reviewed: (see below)  Medications:     Current Facility-Administered Medications   Medication Dose Route Frequency    sodium chloride (NS) flush 5-40 mL  5-40 mL IntraVENous Q8H    sodium chloride (NS) flush 5-40 mL  5-40 mL IntraVENous PRN    morphine injection 5 mg  5 mg IntraVENous Q4H PRN    0.9% sodium chloride with KCl 40 mEq/L infusion   IntraVENous CONTINUOUS    acetaminophen (TYLENOL) tablet 650 mg  650 mg Oral Q4H PRN    diphenhydrAMINE (BENADRYL) capsule 25 mg  25 mg Oral Q4H PRN    ondansetron (ZOFRAN) injection 4 mg  4 mg IntraVENous Q4H PRN    enoxaparin (LOVENOX) injection 40 mg  40 mg SubCUTAneous Q24H    allopurinol (ZYLOPRIM) tablet 100 mg  100 mg Oral DAILY    aspirin delayed-release tablet 81 mg  81 mg Oral DAILY    pantoprazole (PROTONIX) tablet 40 mg  40 mg Oral ACB    sodium chloride (NS) flush 5-10 mL  5-10 mL IntraVENous PRN        Lab Data Reviewed: (see below)  Lab Review:     Recent Labs     12/28/19  0527 12/27/19  1556   WBC 3.9 6.4   HGB 9.1* 10.0*   HCT 26.7* 29.5*    231     Recent Labs     12/28/19  0527 12/27/19  1556   * 134*   K 3.2* 3.0*    102 CO2 19* 22   * 151*   BUN 18 19   CREA 0.96 1.14*   CA 6.6* 7.4*   ALB 2.1* 2.2*   TBILI 0.6 1.1*   SGOT 43* 64*   ALT 53 60     No results found for: GLUCPOC  No results for input(s): PH, PCO2, PO2, HCO3, FIO2 in the last 72 hours. No results for input(s): INR, INREXT in the last 72 hours. All Micro Results     Procedure Component Value Units Date/Time    URINE CULTURE HOLD SAMPLE [[id number]] Collected:  12/28/19 1030    Order Status:  Completed Specimen:  Urine from Serum Updated:  12/28/19 1047     Urine culture hold       URINE ON HOLD IN MICROBIOLOGY DEPT FOR 3 DAYS. IF UNPRESERVED URINE IS SUBMITTED, IT CANNOT BE USED FOR ADDITIONAL TESTING AFTER 24 HRS, RECOLLECTION WILL BE REQUIRED. SENA Schwartz, UR/CSF [[id number]] Collected:  12/28/19 1030    Order Status:  Completed Updated:  12/28/19 1046    LEGIONELLA PNEUMOPHILA AG, URINE [[id number]] Collected:  12/28/19 1030    Order Status:  Completed Specimen:  Urine Updated:  12/28/19 [address] [[id number]] Collected:  12/28/19 0835    Order Status:  Completed Specimen:  NASOPHARYNGEAL SWAB Updated:  12/28/19 0848    ENTERIC BACTERIA PANEL, DNA [[id number]]     Order Status:  Sent Specimen:  Stool     CULTURE, BLOOD [[id number]] Collected:  12/27/19 1627    Order Status:  Completed Specimen:  Blood Updated:  12/28/19 0643     Special Requests: NO SPECIAL REQUESTS        Culture result: NO GROWTH AFTER 12 HOURS       CULTURE, BLOOD [[id number]] Collected:  12/27/19 1556    Order Status:  Completed Specimen:  Blood Updated:  12/28/19 0643     Special Requests: NO SPECIAL REQUESTS        Culture result: NO GROWTH AFTER 12 HOURS             Other pertinent lab: none    Total time spent with patient: 39 Minutes I reviewed chart, notes, data and current medications in the medical record. I have examined and treated the patient at bedside during this period.                  Care Plan discussed with: Patient, Family, Care Manager, Nursing Staff, Consultant/Specialist and >50% of time spent in counseling and coordination of care    Discussed:  Care Plan and D/C Planning    Prophylaxis:  Lovenox and H2B/PPI    Disposition:  Home w/Family           ___________________________________________________    Attending Physician: Vita Torres MD

## 2019-12-28 NOTE — CONSULTS
[facility] at [address], [address] [place] [address]  Torin Morelands: [phone]  F: [phone]      Reason for Visit:   Anuradha Joseph is a 68 y.o. female who is seen in consultation at the request of Dr. Pa Mahan for evaluation of myeloma, abnormal chest Xray. History of Present Illness:   Anuradha Joseph is a pleasant 68 y.o. female who was admitted on 12/27/2019 with a syncopal episode. She was recently diagnosed with multiple myeloma, receiving treatment in [place]. She has been having a low-grade fever, along with cough and congestion for several days. She passed out in the bathroom, prompting family to call EMS and bring her to the hosptial.  She was found to have hypotension, treated with IVFs with improvement. She reports poor PO intake at home over the past week. She reports her myeloma was diagnosed about 3 weeks ago. She has received one cycle of VRd, currently on week off. Past Medical History:   Diagnosis Date    GERD (gastroesophageal reflux disease)     Gout     Multiple myeloma (HCC)       No past surgical history on file. Social History     Tobacco Use    Smoking status: Not on file   Substance Use Topics    Alcohol use: Not on file      No family history on file.   Current Facility-Administered Medications   Medication Dose Route Frequency    sodium chloride (NS) flush 5-40 mL  5-40 mL IntraVENous Q8H    sodium chloride (NS) flush 5-40 mL  5-40 mL IntraVENous PRN    morphine injection 5 mg  5 mg IntraVENous Q4H PRN    0.9% sodium chloride with KCl 40 mEq/L infusion   IntraVENous CONTINUOUS    acetaminophen (TYLENOL) tablet 650 mg  650 mg Oral Q4H PRN    diphenhydrAMINE (BENADRYL) capsule 25 mg  25 mg Oral Q4H PRN    ondansetron (ZOFRAN) injection 4 mg  4 mg IntraVENous Q4H PRN    enoxaparin (LOVENOX) injection 40 mg  40 mg SubCUTAneous Q24H    allopurinol (ZYLOPRIM) tablet 100 mg  100 mg Oral DAILY    aspirin delayed-release tablet 81 mg  81 mg Oral DAILY    pantoprazole (PROTONIX) tablet 40 mg  40 mg Oral ACB    sodium chloride (NS) flush 5-10 mL  5-10 mL IntraVENous PRN      Allergies   Allergen Reactions    Bactrim [Sulfamethoprim] Myalgia    Pcn [Penicillins] Swelling        Review of Systems: A complete review of systems was obtained, negative except as described above. Physical Exam:     Visit Vitals  /78 (BP 1 Location: Left arm, BP Patient Position: At rest)   Pulse 94   Temp 99.9 °F (37.7 °C)   Resp 22   Ht 5' 7\" (1.702 m)   Wt 203 lb (92.1 kg)   SpO2 95%   BMI 31.79 kg/m²     General: No distress  Eyes: PERRLA, anicteric sclerae  HENT: Atraumatic, OP clear  Neck: Supple  Lymphatic: No cervical, supraclavicular, or inguinal adenopathy  Respiratory: CTAB, normal respiratory effort  CV: Normal rate, regular rhythm, no murmurs, no peripheral edema  GI: Soft, nontender, nondistended, no masses, no hepatomegaly, no splenomegaly  Skin: No rashes, ecchymoses, or petechiae. Normal temperature, turgor, and texture. Psych: Alert, oriented, appropriate affect, normal judgment/insight    Results:     Lab Results   Component Value Date/Time    WBC 3.9 12/28/2019 05:27 AM    HGB 9.1 (L) 12/28/2019 05:27 AM    HCT 26.7 (L) 12/28/2019 05:27 AM    PLATELET 500 03/65/4796 05:27 AM    MCV 95.0 12/28/2019 05:27 AM    ABS.  NEUTROPHILS 3.7 12/28/2019 05:27 AM     Lab Results   Component Value Date/Time    Sodium 135 (L) 12/28/2019 05:27 AM    Potassium 3.2 (L) 12/28/2019 05:27 AM    Chloride 106 12/28/2019 05:27 AM    CO2 19 (L) 12/28/2019 05:27 AM    Glucose 239 (H) 12/28/2019 05:27 AM    BUN 18 12/28/2019 05:27 AM    Creatinine 0.96 12/28/2019 05:27 AM    GFR est AA >60 12/28/2019 05:27 AM    GFR est non-AA 57 (L) 12/28/2019 05:27 AM    Calcium 6.6 (L) 12/28/2019 05:27 AM     Lab Results   Component Value Date/Time    Bilirubin, total 0.6 12/28/2019 05:27 AM    ALT (SGPT) 53 12/28/2019 05:27 AM    AST (SGOT) 43 (H) 12/28/2019 05:27 AM Alk. phosphatase 82 12/28/2019 05:27 AM    Protein, total 7.5 12/28/2019 05:27 AM    Albumin 2.1 (L) 12/28/2019 05:27 AM    Globulin 5.4 (H) 12/28/2019 05:27 AM     Lab Results   Component Value Date/Time    Reticulocyte count 1.2 12/28/2019 08:35 AM    Iron % saturation 8 (L) 12/28/2019 08:35 AM    TIBC 184 (L) 12/28/2019 08:35 AM    Ferritin 2,101 (H) 12/28/2019 08:35 AM    Vitamin B12 724 12/28/2019 08:35 AM    Folate 19.9 12/28/2019 08:35 AM    Haptoglobin 357 (H) 12/28/2019 08:35 AM     (H) 12/28/2019 08:35 AM    TSH 0.04 (L) 12/28/2019 08:35 AM       CTA Chest 12/28/2019:  1. No evidence pulmonary embolism. 2. Very small bilateral pleural effusions. 3. Bilateral interstitial, nodular and patchy airspace disease, predominantly in  a perihilar distribution. 4. Ill-defined mediastinal and hilar soft tissue, possibly representing reactive  lymph nodes. 5. T8 and T11 compression deformities, as described above. MR can be performed  for further evaluation, as indicated. Records reviewed and summarized above. Assessment/Recommendations:   1) Myeloma  On VRd. Currently on week off of therapy. Follow up with oncologist next week as scheduled. 2) Hypotension, Syncope  Improving with IVFs. Likely related to influenza. 3) Hypoxia, abnormal Chest CT  Workup ongoing by hospitalist.  Flu positive. 4) Anemia, normocytic  Likely due to myeloma and associated treatment. Labwork negative for iron, b12, or folate deficiency. Thyroid issues could be contributing. Monitor, transfuse for HGB <7.    5) Low TSH  Agree with further laboratory evaluation. I appreciate the opportunity to participate in Ms. Tristan Up's care.     Signed By: Jerry Bowen MD

## 2019-12-28 NOTE — PROGRESS NOTES
Bedside and Verbal shift change report given to MATI Garcia (oncoming nurse) by Daisy Lockett (offgoing nurse). Report included the following information SBAR, Kardex and Recent Results.

## 2019-12-28 NOTE — ED NOTES
Bedside and Verbal shift change report given to [address] [place] [address] [place]  (oncoming nurse) by Bennie Lowe RN  (offgoing nurse). Report included the following information SBAR, ED Summary, MAR and Recent Results.  02 @ /NC

## 2019-12-28 NOTE — PROGRESS NOTES
Unable to do home oxygen assessment at this time. Patient is getting procedure at bedside. Patient is extremely SOB while laying in the bed and has expiratory wheezes. She is on 3L NC, sats 94%. Nurse at bedside as well. Will try again when she is able.

## 2019-12-28 NOTE — ED NOTES
TRANSFER - OUT REPORT:    Verbal report given to MATI Maria(name) on Mariel Lindsay  being transferred to 5th Floor (unit) for routine progression of care       Report consisted of patients Situation, Background, Assessment and   Recommendations(SBAR). Information from the following report(s) SBAR, Kardex, ED Summary, MAR, Accordion and Recent Results was reviewed with the receiving nurse. Lines:   Peripheral IV 12/27/19 Right Antecubital (Active)   Site Assessment Clean, dry, & intact 12/27/2019  3:52 PM   Phlebitis Assessment 0 12/27/2019  3:52 PM   Infiltration Assessment 0 12/27/2019  3:52 PM   Dressing Status Clean, dry, & intact 12/27/2019  3:52 PM   Dressing Type Transparent 12/27/2019  3:52 PM   Hub Color/Line Status Green;Capped; Patent; Flushed 12/27/2019  3:52 PM   Alcohol Cap Used Yes 12/27/2019  3:52 PM        Opportunity for questions and clarification was provided.       Patient transported with:   Monitor  O2 @ 2 liters  Registered Nurse

## 2019-12-28 NOTE — ED NOTES
Patient back on [employer] after 02 discontinued, labored breathing and wheezing continues. Patient reports actually feeling worse instead of better. Dr. Katie Cm made aware.

## 2019-12-29 PROBLEM — J11.1 INFLUENZA: Status: ACTIVE | Noted: 2019-12-29

## 2019-12-29 LAB
ALBUMIN SERPL-MCNC: 1.9 G/DL (ref 3.5–5)
ALBUMIN/GLOB SERPL: 0.3 {RATIO} (ref 1.1–2.2)
ALP SERPL-CCNC: 91 U/L (ref 45–117)
ALT SERPL-CCNC: 50 U/L (ref 12–78)
ANION GAP SERPL CALC-SCNC: 6 MMOL/L (ref 5–15)
AST SERPL-CCNC: 47 U/L (ref 15–37)
ATRIAL RATE: 93 BPM
BILIRUB SERPL-MCNC: 0.4 MG/DL (ref 0.2–1)
BUN SERPL-MCNC: 18 MG/DL (ref 6–20)
BUN/CREAT SERPL: 23 (ref 12–20)
CALCIUM SERPL-MCNC: 6.6 MG/DL (ref 8.5–10.1)
CALCULATED P AXIS, ECG09: 23 DEGREES
CALCULATED R AXIS, ECG10: -22 DEGREES
CALCULATED T AXIS, ECG11: 66 DEGREES
CHLORIDE SERPL-SCNC: 115 MMOL/L (ref 97–108)
CO2 SERPL-SCNC: 21 MMOL/L (ref 21–32)
CREAT SERPL-MCNC: 0.79 MG/DL (ref 0.55–1.02)
DIAGNOSIS, 93000: NORMAL
ERYTHROCYTE [DISTWIDTH] IN BLOOD BY AUTOMATED COUNT: 15.2 % (ref 11.5–14.5)
GLOBULIN SER CALC-MCNC: 5.7 G/DL (ref 2–4)
GLUCOSE SERPL-MCNC: 125 MG/DL (ref 65–100)
HCT VFR BLD AUTO: 26.3 % (ref 35–47)
HGB BLD-MCNC: 8.9 G/DL (ref 11.5–16)
MAGNESIUM SERPL-MCNC: 2.2 MG/DL (ref 1.6–2.4)
MCH RBC QN AUTO: 32.4 PG (ref 26–34)
MCHC RBC AUTO-ENTMCNC: 33.8 G/DL (ref 30–36.5)
MCV RBC AUTO: 95.6 FL (ref 80–99)
NRBC # BLD: 0 K/UL (ref 0–0.01)
NRBC BLD-RTO: 0 PER 100 WBC
P-R INTERVAL, ECG05: 136 MS
PHOSPHATE SERPL-MCNC: 1.9 MG/DL (ref 2.6–4.7)
PLATELET # BLD AUTO: 253 K/UL (ref 150–400)
PMV BLD AUTO: 10.1 FL (ref 8.9–12.9)
POTASSIUM SERPL-SCNC: 3.9 MMOL/L (ref 3.5–5.1)
PROT SERPL-MCNC: 7.6 G/DL (ref 6.4–8.2)
Q-T INTERVAL, ECG07: 394 MS
QRS DURATION, ECG06: 92 MS
QTC CALCULATION (BEZET), ECG08: 489 MS
RBC # BLD AUTO: 2.75 M/UL (ref 3.8–5.2)
SODIUM SERPL-SCNC: 142 MMOL/L (ref 136–145)
VENTRICULAR RATE, ECG03: 93 BPM
WBC # BLD AUTO: 3.2 K/UL (ref 3.6–11)

## 2019-12-29 PROCEDURE — 83735 ASSAY OF MAGNESIUM: CPT

## 2019-12-29 PROCEDURE — 94760 N-INVAS EAR/PLS OXIMETRY 1: CPT

## 2019-12-29 PROCEDURE — 97530 THERAPEUTIC ACTIVITIES: CPT

## 2019-12-29 PROCEDURE — 84100 ASSAY OF PHOSPHORUS: CPT

## 2019-12-29 PROCEDURE — 85027 COMPLETE CBC AUTOMATED: CPT

## 2019-12-29 PROCEDURE — 36415 COLL VENOUS BLD VENIPUNCTURE: CPT

## 2019-12-29 PROCEDURE — 74011000250 HC RX REV CODE- 250: Performed by: INTERNAL MEDICINE

## 2019-12-29 PROCEDURE — 94640 AIRWAY INHALATION TREATMENT: CPT

## 2019-12-29 PROCEDURE — 74011250636 HC RX REV CODE- 250/636: Performed by: INTERNAL MEDICINE

## 2019-12-29 PROCEDURE — 65660000000 HC RM CCU STEPDOWN

## 2019-12-29 PROCEDURE — 97161 PT EVAL LOW COMPLEX 20 MIN: CPT

## 2019-12-29 PROCEDURE — 77030027138 HC INCENT SPIROMETER -A

## 2019-12-29 PROCEDURE — 80053 COMPREHEN METABOLIC PANEL: CPT

## 2019-12-29 PROCEDURE — 74011250637 HC RX REV CODE- 250/637: Performed by: INTERNAL MEDICINE

## 2019-12-29 PROCEDURE — 77010033678 HC OXYGEN DAILY

## 2019-12-29 PROCEDURE — 74011636637 HC RX REV CODE- 636/637: Performed by: INTERNAL MEDICINE

## 2019-12-29 PROCEDURE — 77030038269 HC DRN EXT URIN PURWCK BARD -A

## 2019-12-29 RX ORDER — PREDNISONE 20 MG/1
20 TABLET ORAL 2 TIMES DAILY WITH MEALS
Status: DISCONTINUED | OUTPATIENT
Start: 2019-12-29 | End: 2019-12-30

## 2019-12-29 RX ORDER — IPRATROPIUM BROMIDE AND ALBUTEROL SULFATE 2.5; .5 MG/3ML; MG/3ML
3 SOLUTION RESPIRATORY (INHALATION)
Status: DISCONTINUED | OUTPATIENT
Start: 2019-12-29 | End: 2019-12-31 | Stop reason: HOSPADM

## 2019-12-29 RX ADMIN — ACETAMINOPHEN 650 MG: 325 TABLET ORAL at 23:15

## 2019-12-29 RX ADMIN — ACETAMINOPHEN 650 MG: 325 TABLET ORAL at 17:30

## 2019-12-29 RX ADMIN — PREDNISONE 20 MG: 20 TABLET ORAL at 09:59

## 2019-12-29 RX ADMIN — ALLOPURINOL 100 MG: 100 TABLET ORAL at 08:07

## 2019-12-29 RX ADMIN — IPRATROPIUM BROMIDE AND ALBUTEROL SULFATE 3 ML: .5; 3 SOLUTION RESPIRATORY (INHALATION) at 08:59

## 2019-12-29 RX ADMIN — IPRATROPIUM BROMIDE AND ALBUTEROL SULFATE 3 ML: .5; 3 SOLUTION RESPIRATORY (INHALATION) at 13:45

## 2019-12-29 RX ADMIN — OSELTAMIVIR PHOSPHATE 75 MG: 75 CAPSULE ORAL at 17:27

## 2019-12-29 RX ADMIN — PANTOPRAZOLE SODIUM 40 MG: 40 TABLET, DELAYED RELEASE ORAL at 07:23

## 2019-12-29 RX ADMIN — Medication 10 ML: at 23:15

## 2019-12-29 RX ADMIN — ACETAMINOPHEN 650 MG: 325 TABLET ORAL at 11:37

## 2019-12-29 RX ADMIN — ENOXAPARIN SODIUM 40 MG: 40 INJECTION SUBCUTANEOUS at 08:08

## 2019-12-29 RX ADMIN — OSELTAMIVIR PHOSPHATE 75 MG: 75 CAPSULE ORAL at 08:07

## 2019-12-29 RX ADMIN — Medication 10 ML: at 14:46

## 2019-12-29 RX ADMIN — ASPIRIN 81 MG: 81 TABLET, COATED ORAL at 08:08

## 2019-12-29 RX ADMIN — PREDNISONE 20 MG: 20 TABLET ORAL at 17:27

## 2019-12-29 RX ADMIN — Medication 10 ML: at 03:13

## 2019-12-29 RX ADMIN — IPRATROPIUM BROMIDE AND ALBUTEROL SULFATE 3 ML: .5; 3 SOLUTION RESPIRATORY (INHALATION) at 18:17

## 2019-12-29 RX ADMIN — IPRATROPIUM BROMIDE AND ALBUTEROL SULFATE 3 ML: .5; 3 SOLUTION RESPIRATORY (INHALATION) at 23:09

## 2019-12-29 RX ADMIN — ACETAMINOPHEN 650 MG: 325 TABLET ORAL at 07:28

## 2019-12-29 RX ADMIN — SODIUM CHLORIDE AND POTASSIUM CHLORIDE: 9; 2.98 INJECTION, SOLUTION INTRAVENOUS at 03:12

## 2019-12-29 RX ADMIN — SODIUM CHLORIDE AND POTASSIUM CHLORIDE: 9; 2.98 INJECTION, SOLUTION INTRAVENOUS at 09:59

## 2019-12-29 NOTE — PROGRESS NOTES
Bedside shift change report given to Candace (oncoming nurse) by Dionna Navas (offgoing nurse). Report included the following information SBAR, Kardex, MAR and Recent Results.

## 2019-12-29 NOTE — PROGRESS NOTES
Problem: Mobility Impaired (Adult and Pediatric)  Goal: *Therapy Goal (Edit Goal, Insert Text)  Description  FUNCTIONAL STATUS PRIOR TO ADMISSION: Patient was independent and active without use of DME.    HOME SUPPORT PRIOR TO ADMISSION: The patient lived with family members who provided support when needed. Physical Therapy Goals  Initiated 12/29/2019  1. Patient will move from supine to sit and sit to supine  in bed with supervision/set-up within 7 day(s). 2.  Patient will transfer from bed to chair and chair to bed with minimal assistance/contact guard assist using the least restrictive device within 7 day(s). 3.  Patient will perform sit to stand with supervision/set-up within 7 day(s). 4.  Patient will ambulate with minimal assistance/contact guard assist for 50 feet with the least restrictive device within 7 day(s). 5.  Patient will ascend/descend 5 stairs with bilateral handrail(s) with minimal assistance/contact guard assist within 7 day(s). Outcome: Progressing Towards Goal   PHYSICAL THERAPY EVALUATION  Patient: Marylee Pinks (73 y.o. female)  Date: 12/29/2019  Primary Diagnosis: Bilateral pneumonia [J18.9]        Precautions: Fall         ASSESSMENT  Based on the objective data described below, the patient presents with difficulty breathing which limits bed mobility and transfer activity. Pt has generalized weakness through out all extremities and and her sitting balance is fair +. Pt lives in [place] and was visiting family for the holidays. Pt's plans to be discharged to families home in the area and then back to [place]. Pt received in semi-reclined position in bed with 3L O2 NC but displayed very heavy labored breathing; so much so that it was difficult for pt to talk. Pt requires min assist for bed mobility and the efforts required to come to sitting on EOB were extremely taxing for pt.  Pt also has a h/o CA and prior Chemo which may add additional challenges for performing functional activities. Pt will benefit with therapy for functional mobility training to increase strength, improve balance and to maximize overall functional independence. Current Level of Function Impacting Discharge (mobility/balance): mod/min assist for bed mobility and transfers    Functional Outcome Measure: The patient scored 45 on the Barthel Index outcome measure which is indicative of 55% functional limitations. Other factors to consider for discharge: h/o CA with chemo and pt lives in [place]     Patient will benefit from skilled therapy intervention to address the above noted impairments. PLAN :  Recommendations and Planned Interventions: bed mobility training, transfer training, gait training, therapeutic exercises, patient and family training/education, and therapeutic activities      Frequency/Duration: Patient will be followed by physical therapy:  5 times a week to address goals. Recommendation for discharge: (in order for the patient to meet his/her long term goals)  To be determined: This discharge recommendation:  Has not yet been discussed the attending provider and/or case management    IF patient discharges home will need the following DME: to be determined (TBD)         SUBJECTIVE:   Patient stated It wears me out just getting to the edge of the bed.     OBJECTIVE DATA SUMMARY:   HISTORY:    Past Medical History:   Diagnosis Date    GERD (gastroesophageal reflux disease)     Gout     Multiple myeloma (HCC)    No past surgical history on file.     Personal factors and/or comorbidities impacting plan of care: CA with chemo, syncopal episodes    Home Situation  Home Environment: Private residence(lives in [place])  [address] [place] [address] St: One story  Living Alone: No  Support Systems: Family member(s)  Patient Expects to be Discharged to[de-identified] Private residence  Current DME Used/Available at Home: None    EXAMINATION/PRESENTATION/DECISION MAKING:   Critical Behavior:  Neurologic State: Restless, Other (Comment)(labored breathing)  Orientation Level: Oriented X4  Cognition: Appropriate decision making, Appropriate for age attention/concentration     Hearing: Auditory  Auditory Impairment: Hard of hearing, bilateral  Skin:  age appropriate  Edema: no edema noted during this Rx session  Range Of Motion:  AROM: Generally decreased, functional           PROM: Within functional limits           Strength:    Strength: Generally decreased, functional                    Tone & Sensation:   Tone: Normal              Sensation: Intact               Coordination:  Coordination: Generally decreased, functional  Vision:      Functional Mobility:  Bed Mobility:  Rolling: Contact guard assistance  Supine to Sit: Minimum assistance  Sit to Supine: Minimum assistance  Scooting: Contact guard assistance  Transfers:  Sit to Stand: Moderate assistance  Stand to Sit: Contact guard assistance  Stand Pivot Transfers: Moderate assistance                    Balance:   Sitting: Intact; Without support  Standing: Intact;Pull to stand; With support  Ambulation/Gait Training:              Gait Description (WDL): Exceptions to WDL                                          Stairs:      Not assessed during this Rx session        Therapeutic Exercises:     Not done during this Rx session    Functional Measure:  Barthel Index:    Bathin  Bladder: 5  Bowels: 10  Groomin  Dressin  Feeding: 10  Mobility: 5  Stairs: 0  Toilet Use: 10  Transfer (Bed to Chair and Back): 5  Total: 45/100       The Barthel ADL Index: Guidelines  1. The index should be used as a record of what a patient does, not as a record of what a patient could do. 2. The main aim is to establish degree of independence from any help, physical or verbal, however minor and for whatever reason. 3. The need for supervision renders the patient not independent. 4. A patient's performance should be established using the best available evidence.  Asking the patient, friends/relatives and nurses are the usual sources, but direct observation and common sense are also important. However direct testing is not needed. 5. Usually the patient's performance over the preceding 24-48 hours is important, but occasionally longer periods will be relevant. 6. Middle categories imply that the patient supplies over 50 per cent of the effort. 7. Use of aids to be independent is allowed. Mee Rosado., Barthel, D.W. (4714). Functional evaluation: the Barthel Index. [address] [facility] (14)2. Rebel Perez willie TERRIE Bello, Hipolito Parham., Connie Koo., Geoffrey, [address] (). Measuring the change indisability after inpatient rehabilitation; comparison of the responsiveness of the Barthel Index and Functional Kulm Measure. Journal of Neurology, Neurosurgery, and Psychiatry, 66(4), 146-594. OLMAN Pineda.CELY, OMER Michel, & Juana Booth, M.A. (2004.) Assessment of post-stroke quality of life in cost-effectiveness studies: The usefulness of the Barthel Index and the EuroQoL-5D. Quality of Life Research, 15, 309-69           Physical Therapy Evaluation Charge Determination   History Examination Presentation Decision-Making   MEDIUM  Complexity : 1-2 comorbidities / personal factors will impact the outcome/ POC  LOW Complexity : 1-2 Standardized tests and measures addressing body structure, function, activity limitation and / or participation in recreation  MEDIUM Complexity : Evolving with changing characteristics  LOW Complexity : FOTO score of       Based on the above components, the patient evaluation is determined to be of the following complexity level: LOW     Pain Ratin/10    Activity Tolerance:   Fair  Please refer to the flowsheet for vital signs taken during this treatment.     After treatment patient left in no apparent distress:   Supine in bed, Call bell within reach, Bed / chair alarm activated, and Caregiver / family present    COMMUNICATION/EDUCATION:   The patients plan of care was discussed with: Registered Nurse. Fall prevention education was provided and the patient/caregiver indicated understanding., Patient/family have participated as able in goal setting and plan of care. , and Patient/family agree to work toward stated goals and plan of care.     Thank you for this referral.  Emir Roy, PT   Time Calculation: 25 mins

## 2019-12-29 NOTE — PROGRESS NOTES
Increased expiratory wheezing and respiratory effort in comparison to previous assessments. Nebulizer received per prn orders. Oxygen saturation >90% on 3l of oxygen. Md notified, order received to hold IVF.

## 2019-12-29 NOTE — PROGRESS NOTES
Bedside and Verbal shift change report given to MATI Garcia (oncoming nurse) by Peggyann Shone (offgoing nurse). Report included the following information SBAR and Kardex.

## 2019-12-29 NOTE — PROGRESS NOTES
[facility]    Medical Progress Note      NAME: Luzmaria Charles   :  1950  MRM:  [id number]    Date/Time: 2019  10:43 AM          Assessment and Plan:     Syncope / Hypotension - POA, vasovagal, due to Flu, and poor PO intake for 9 days. Hydrated. Check orthostatics. Multiple myeloma / Anemia - POA, just started chemo. Consulted oncology. Anemia worsened after hydration ans expected. Influenza A / Abormal x-ray / Hypoxemia - Flu A positive. Start tamiflu. This explains CT findings. Follow other serologies and cx. Dyspnea - Worsening overnight. Due to Flu. Add prednisone and prn duonebs. No Abx. Check 6 minute walk prior to DC    Low TSH level - TSH 0.06. Normal T4 and T3. Unclear relationship to MM or chemo, but could be due to Flu. Dehydration / hyponatremia / hypokalemia - POA due to poor PO intake. Hydrate and replete lytes. Hyperglycemia - Noted on labs. No Hx of DM. May have DM. PCP to follow. Obese - Monitor weight during chemo    Gout - No symptoms on allopurinol    GERD (gastroesophageal reflux disease) - PPI    Hypoalbuminemia - Likely related to cancer, chemo and anorexia. Nutrition supplements and consult. Subjective:     Chief Complaint:  Feels worse, more dyspnea and wheezing    ROS:  (bold if positive, if negative)    SOB/DOETolerating some PT  Tolerating some Diet        Objective:     Last 24hrs VS reviewed since prior progress note.  Most recent are:    Visit Vitals  /63 (BP 1 Location: Left arm, BP Patient Position: At rest)   Pulse 96   Temp 98.1 °F (36.7 °C)   Resp 20   Ht 5' 7\" (1.702 m)   Wt 93 kg (205 lb 0.4 oz)   SpO2 95%   BMI 32.11 kg/m²     SpO2 Readings from Last 6 Encounters:   19 95%    O2 Flow Rate (L/min): 3 l/min       Intake/Output Summary (Last 24 hours) at 2019 1043  Last data filed at 2019 0935  Gross per 24 hour   Intake 4000 ml   Output 2300 ml   Net 1700 ml        Physical Exam:    Gen: Well-developed, well-nourished, in no acute distress  HEENT:  Pink conjunctivae, PERRL, hearing intact to voice, moist mucous membranes  Neck:  Supple, without masses, thyroid non-tender  Resp:  No accessory muscle use, clear breath sounds without wheezes rales or rhonchi  Card:  No murmurs, tachycardic S1, S2 without thrills, bruits or peripheral edema  Abd:  Soft, non-tender, non-distended, normoactive bowel sounds are present, no mass  Lymph:  No cervical or inguinal adenopathy  Musc:  No cyanosis or clubbing  Skin:  No rashes or ulcers, skin turgor is good  Neuro:  Cranial nerves are grossly intact, mild motor weakness, follows commands appropriately  Psych:  Good insight, oriented to person, place and time, alert    Telemetry reviewed:   normal sinus rhythm  __________________________________________________________________  Medications Reviewed: (see below)  Medications:     Current Facility-Administered Medications   Medication Dose Route Frequency    albuterol-ipratropium (DUO-NEB) 2.5 MG-0.5 MG/3 ML  3 mL Nebulization Q4H PRN    predniSONE (DELTASONE) tablet 20 mg  20 mg Oral BID WITH MEALS    sodium chloride (NS) flush 5-40 mL  5-40 mL IntraVENous Q8H    sodium chloride (NS) flush 5-40 mL  5-40 mL IntraVENous PRN    morphine injection 5 mg  5 mg IntraVENous Q4H PRN    0.9% sodium chloride with KCl 40 mEq/L infusion   IntraVENous CONTINUOUS    acetaminophen (TYLENOL) tablet 650 mg  650 mg Oral Q4H PRN    diphenhydrAMINE (BENADRYL) capsule 25 mg  25 mg Oral Q4H PRN    ondansetron (ZOFRAN) injection 4 mg  4 mg IntraVENous Q4H PRN    enoxaparin (LOVENOX) injection 40 mg  40 mg SubCUTAneous Q24H    allopurinol (ZYLOPRIM) tablet 100 mg  100 mg Oral DAILY    aspirin delayed-release tablet 81 mg  81 mg Oral DAILY    pantoprazole (PROTONIX) tablet 40 mg  40 mg Oral ACB    oseltamivir (TAMIFLU) capsule 75 mg  75 mg Oral BID    sodium chloride (NS) flush 5-10 mL  5-10 mL IntraVENous PRN        Lab Data Reviewed: (see below)  Lab Review:     Recent Labs     12/29/19  0322 12/28/19  0527 12/27/19  1556   WBC 3.2* 3.9 6.4   HGB 8.9* 9.1* 10.0*   HCT 26.3* 26.7* 29.5*    207 231     Recent Labs     12/29/19  0322 12/28/19  0527 12/27/19  1556    135* 134*   K 3.9 3.2* 3.0*   * 106 102   CO2 21 19* 22   * 239* 151*   BUN 18 18 19   CREA 0.79 0.96 1.14*   CA 6.6* 6.6* 7.4*   MG 2.2  --   --    PHOS 1.9*  --   --    ALB 1.9* 2.1* 2.2*   TBILI 0.4 0.6 1.1*   SGOT 47* 43* 64*   ALT 50 53 60     No results found for: GLUCPOC  No results for input(s): PH, PCO2, PO2, HCO3, FIO2 in the last 72 hours. No results for input(s): INR, INREXT, INREXT in the last 72 hours.   All Micro Results     Procedure Component Value Units Date/Time    CULTURE, BLOOD [[id number]] Collected:  12/27/19 1627    Order Status:  Completed Specimen:  Blood Updated:  12/29/19 0558     Special Requests: NO SPECIAL REQUESTS        Culture result: NO GROWTH 2 DAYS       CULTURE, BLOOD [[id number]] Collected:  12/27/19 1556    Order Status:  Completed Specimen:  Blood Updated:  12/29/19 0558     Special Requests: NO SPECIAL REQUESTS        Culture result: NO GROWTH 2 DAYS       RESPIRATORY PANEL,PCR,NASOPHARYNGEAL [[id number]]  (Abnormal) Collected:  12/28/19 0835    Order Status:  Completed Specimen:  Nasopharyngeal Updated:  12/28/19 1311     Adenovirus NOT DETECTED        Coronavirus 229E NOT DETECTED        Coronavirus HKU1 NOT DETECTED        Coronavirus CVNL63 NOT DETECTED        Coronavirus OC43 NOT DETECTED        Metapneumovirus NOT DETECTED        Rhinovirus and Enterovirus NOT DETECTED        Influenza A NOT DETECTED        Influenza A, subtype H1 NOT DETECTED        Influenza A, subtype H3 NOT DETECTED        INFLUENZA A H1N1 PCR DETECTED        Influenza B NOT DETECTED        Parainfluenza 1 NOT DETECTED        Parainfluenza 2 NOT DETECTED        Parainfluenza 3 NOT DETECTED        Parainfluenza virus 4 NOT DETECTED RSV by PCR NOT DETECTED        B. parapertussis, PCR NOT DETECTED        Bordetella pertussis - PCR NOT DETECTED        Chlamydophila pneumoniae DNA, QL, PCR NOT DETECTED        Mycoplasma pneumoniae DNA, QL, PCR NOT DETECTED       URINE CULTURE HOLD SAMPLE [[id number]] Collected:  12/28/19 1030    Order Status:  Completed Specimen:  Urine from Serum Updated:  12/28/19 1047     Urine culture hold       URINE ON HOLD IN MICROBIOLOGY DEPT FOR 3 DAYS. IF UNPRESERVED URINE IS SUBMITTED, IT CANNOT BE USED FOR ADDITIONAL TESTING AFTER 24 HRS, RECOLLECTION WILL BE REQUIRED. S. Marc Conde, UR/CSF [[id number]] Collected:  12/28/19 1030    Order Status:  Completed Updated:  12/28/19 1046    LEGIONELLA PNEUMOPHILA AG, URINE [[id number]] Collected:  12/28/19 1030    Order Status:  Completed Specimen:  Urine Updated:  12/28/19 1045    ENTERIC BACTERIA PANEL, DNA [[id number]]     Order Status:  Sent Specimen:  Stool           Other pertinent lab: none    Total time spent with patient: 39 Minutes I reviewed chart, notes, data and current medications in the medical record. I have examined and treated the patient at bedside during this period.                  Care Plan discussed with: Patient, Family, Care Manager, Nursing Staff, Consultant/Specialist and >50% of time spent in counseling and coordination of care    Discussed:  Care Plan and D/C Planning    Prophylaxis:  Lovenox and H2B/PPI    Disposition:  Home w/Family           ___________________________________________________    Attending Physician: Tristan Lindquist MD

## 2019-12-29 NOTE — PROGRESS NOTES
[facility] [place] at [address], [address] [place] [address]  [facility] [phone]  F: [phone]      Reason for Visit:   Shandra Scott is a 68 y.o. female who is seen for follow up of multiple myeloma, influenza. History of Present Illness:   Shandra Scott is a pleasant 68 y.o. female who was admitted on 12/27/2019 with a syncopal episode. She was recently diagnosed with multiple myeloma, receiving treatment in [place]. She has been having a low-grade fever, along with cough and congestion for several days. She passed out in the bathroom, prompting family to call EMS and bring her to the hosptial.  She was found to have hypotension, treated with IVFs with improvement. She reports poor PO intake at home over the past week. She reports her myeloma was diagnosed about 3 weeks ago. She has received one cycle of VRd, currently on week off. Interval History:   Feeling worse today, increasing dyspnea and respiratory distress, wheezing. No pain. PAST HISTORY: The following sections were reviewed and updated in the EMR as appropriate: PMH, SH, FH, Medications, Allergies. Allergies   Allergen Reactions    Bactrim [Sulfamethoprim] Myalgia    Pcn [Penicillins] Swelling      Review of Systems: A complete review of systems was obtained, reviewed. Pertinent findings reviewed above.       Physical Exam:     Visit Vitals  /82 (BP 1 Location: Left arm, BP Patient Position: At rest)   Pulse 94   Temp 98.7 °F (37.1 °C)   Resp 22   Ht 5' 7\" (1.702 m)   Wt 205 lb 0.4 oz (93 kg)   SpO2 95%   BMI 32.11 kg/m²     General: No distress  Respiratory: Tachypnea, increased work of breathing, wheezing  CV: No peripheral edema  Skin: No rashes, ecchymoses, or petechiae  Psych: Alert, oriented, normal mood/affect    Results:     Lab Results   Component Value Date/Time    WBC 3.2 (L) 12/29/2019 03:22 AM    HGB 8.9 (L) 12/29/2019 03:22 AM    HCT 26.3 (L) 12/29/2019 03:22 AM PLATELET 677 81/30/6448 03:22 AM    MCV 95.6 12/29/2019 03:22 AM    ABS. NEUTROPHILS 3.7 12/28/2019 05:27 AM     Lab Results   Component Value Date/Time    Sodium 142 12/29/2019 03:22 AM    Potassium 3.9 12/29/2019 03:22 AM    Chloride 115 (H) 12/29/2019 03:22 AM    CO2 21 12/29/2019 03:22 AM    Glucose 125 (H) 12/29/2019 03:22 AM    BUN 18 12/29/2019 03:22 AM    Creatinine 0.79 12/29/2019 03:22 AM    GFR est AA >60 12/29/2019 03:22 AM    GFR est non-AA >60 12/29/2019 03:22 AM    Calcium 6.6 (L) 12/29/2019 03:22 AM     Lab Results   Component Value Date/Time    Bilirubin, total 0.4 12/29/2019 03:22 AM    ALT (SGPT) 50 12/29/2019 03:22 AM    AST (SGOT) 47 (H) 12/29/2019 03:22 AM    Alk. phosphatase 91 12/29/2019 03:22 AM    Protein, total 7.6 12/29/2019 03:22 AM    Albumin 1.9 (L) 12/29/2019 03:22 AM    Globulin 5.7 (H) 12/29/2019 03:22 AM     Lab Results   Component Value Date/Time    Reticulocyte count 1.2 12/28/2019 08:35 AM    Iron % saturation 8 (L) 12/28/2019 08:35 AM    TIBC 184 (L) 12/28/2019 08:35 AM    Ferritin 2,101 (H) 12/28/2019 08:35 AM    Vitamin B12 724 12/28/2019 08:35 AM    Folate 19.9 12/28/2019 08:35 AM    Haptoglobin 357 (H) 12/28/2019 08:35 AM     (H) 12/28/2019 08:35 AM    TSH 0.04 (L) 12/28/2019 08:35 AM       CTA Chest 12/28/2019:  1. No evidence pulmonary embolism. 2. Very small bilateral pleural effusions. 3. Bilateral interstitial, nodular and patchy airspace disease, predominantly in  a perihilar distribution. 4. Ill-defined mediastinal and hilar soft tissue, possibly representing reactive  lymph nodes. 5. T8 and T11 compression deformities, as described above. MR can be performed  for further evaluation, as indicated. Assessment/Recommendations:   1) Myeloma  On VRd. Currently on week off of therapy. Due to resume on Monday, but given her influenza this needs to be held. Follow up with oncologist in [place] once discharged.     2) Hypotension, Syncope  Improved with IVFs.  Likely related to influenza. 3) Influenza, Respiratory failure  Worsening today. Discussed with hospitalist, treating with tamiflu, steroids, nebs. 4) Anemia, normocytic  Likely due to myeloma and associated treatment. Labwork negative for iron, b12, or folate deficiency. Thyroid issues could be contributing. Monitor, transfuse for HGB <7.    5) Subclinical hyperthyroidism  TSH was low. Free T4 was normal.  Recommend repeating this when she returns home. Nothing more to add from a hematology standpoint at this time. Continue management of influenza. I will sign off and she will follow up with her oncologist in [place] on discharge. We we remain available if needed.     Signed By: Warren Altamirano MD

## 2019-12-30 ENCOUNTER — APPOINTMENT (OUTPATIENT)
Dept: GENERAL RADIOLOGY | Age: 69
DRG: 193 | End: 2019-12-30
Attending: INTERNAL MEDICINE
Payer: COMMERCIAL

## 2019-12-30 LAB
ARTERIAL PATENCY WRIST A: YES
BASE DEFICIT BLDA-SCNC: 2.3 MMOL/L
BDY SITE: ABNORMAL
FLUID CULTURE, SPNG2: NORMAL
GAS FLOW.O2 O2 DELIVERY SYS: 5 L/MIN
GLUCOSE BLD STRIP.AUTO-MCNC: 250 MG/DL (ref 65–100)
HCO3 BLDA-SCNC: 20 MMOL/L (ref 22–26)
ORGANISM ID, SPNG3: NORMAL
PCO2 BLDA: 28 MMHG (ref 35–45)
PH BLDA: 7.47 [PH] (ref 7.35–7.45)
PLEASE NOTE, SPNG4: NORMAL
PO2 BLDA: 73 MMHG (ref 80–100)
S PNEUM AG SPEC QL LA: NEGATIVE
SAO2 % BLD: 96 % (ref 92–97)
SAO2% DEVICE SAO2% SENSOR NAME: ABNORMAL
SERVICE CMNT-IMP: ABNORMAL
SPECIMEN SITE: ABNORMAL
SPECIMEN SOURCE: NORMAL
SPECIMEN, SPNG1: NORMAL
T3 SERPL-MCNC: 58 NG/DL (ref 71–180)
UR CULT HOLD, URHOLD: NORMAL

## 2019-12-30 PROCEDURE — 74011250636 HC RX REV CODE- 250/636: Performed by: INTERNAL MEDICINE

## 2019-12-30 PROCEDURE — 87449 NOS EACH ORGANISM AG IA: CPT

## 2019-12-30 PROCEDURE — 71045 X-RAY EXAM CHEST 1 VIEW: CPT

## 2019-12-30 PROCEDURE — 77030038269 HC DRN EXT URIN PURWCK BARD -A

## 2019-12-30 PROCEDURE — 65660000000 HC RM CCU STEPDOWN

## 2019-12-30 PROCEDURE — 74011250637 HC RX REV CODE- 250/637: Performed by: INTERNAL MEDICINE

## 2019-12-30 PROCEDURE — 77010033678 HC OXYGEN DAILY

## 2019-12-30 PROCEDURE — 74011000250 HC RX REV CODE- 250: Performed by: INTERNAL MEDICINE

## 2019-12-30 PROCEDURE — 74011636637 HC RX REV CODE- 636/637: Performed by: INTERNAL MEDICINE

## 2019-12-30 PROCEDURE — 94640 AIRWAY INHALATION TREATMENT: CPT

## 2019-12-30 PROCEDURE — 36600 WITHDRAWAL OF ARTERIAL BLOOD: CPT

## 2019-12-30 PROCEDURE — 82803 BLOOD GASES ANY COMBINATION: CPT

## 2019-12-30 PROCEDURE — 82962 GLUCOSE BLOOD TEST: CPT

## 2019-12-30 PROCEDURE — 94760 N-INVAS EAR/PLS OXIMETRY 1: CPT

## 2019-12-30 RX ORDER — ARFORMOTEROL TARTRATE 15 UG/2ML
15 SOLUTION RESPIRATORY (INHALATION)
Status: DISCONTINUED | OUTPATIENT
Start: 2019-12-30 | End: 2019-12-31 | Stop reason: HOSPADM

## 2019-12-30 RX ORDER — BUDESONIDE 0.5 MG/2ML
500 INHALANT ORAL
Status: DISCONTINUED | OUTPATIENT
Start: 2019-12-30 | End: 2019-12-31 | Stop reason: HOSPADM

## 2019-12-30 RX ORDER — LEVOFLOXACIN 5 MG/ML
750 INJECTION, SOLUTION INTRAVENOUS EVERY 24 HOURS
Status: DISCONTINUED | OUTPATIENT
Start: 2019-12-30 | End: 2019-12-31

## 2019-12-30 RX ADMIN — BUDESONIDE 500 MCG: 0.5 INHALANT RESPIRATORY (INHALATION) at 20:20

## 2019-12-30 RX ADMIN — ENOXAPARIN SODIUM 40 MG: 40 INJECTION SUBCUTANEOUS at 08:00

## 2019-12-30 RX ADMIN — OSELTAMIVIR PHOSPHATE 75 MG: 75 CAPSULE ORAL at 17:38

## 2019-12-30 RX ADMIN — LEVOFLOXACIN 750 MG: 5 INJECTION, SOLUTION INTRAVENOUS at 11:11

## 2019-12-30 RX ADMIN — ACETAMINOPHEN 650 MG: 325 TABLET ORAL at 20:17

## 2019-12-30 RX ADMIN — PREDNISONE 20 MG: 20 TABLET ORAL at 08:00

## 2019-12-30 RX ADMIN — ASPIRIN 81 MG: 81 TABLET, COATED ORAL at 08:00

## 2019-12-30 RX ADMIN — ACETAMINOPHEN 650 MG: 325 TABLET ORAL at 03:44

## 2019-12-30 RX ADMIN — OSELTAMIVIR PHOSPHATE 75 MG: 75 CAPSULE ORAL at 08:00

## 2019-12-30 RX ADMIN — ARFORMOTEROL TARTRATE 15 MCG: 15 SOLUTION RESPIRATORY (INHALATION) at 20:20

## 2019-12-30 RX ADMIN — Medication 10 ML: at 13:32

## 2019-12-30 RX ADMIN — IPRATROPIUM BROMIDE AND ALBUTEROL SULFATE 3 ML: .5; 3 SOLUTION RESPIRATORY (INHALATION) at 14:35

## 2019-12-30 RX ADMIN — IPRATROPIUM BROMIDE AND ALBUTEROL SULFATE 3 ML: .5; 3 SOLUTION RESPIRATORY (INHALATION) at 03:51

## 2019-12-30 RX ADMIN — METHYLPREDNISOLONE SODIUM SUCCINATE 60 MG: 125 INJECTION, POWDER, FOR SOLUTION INTRAMUSCULAR; INTRAVENOUS at 11:11

## 2019-12-30 RX ADMIN — METHYLPREDNISOLONE SODIUM SUCCINATE 60 MG: 125 INJECTION, POWDER, FOR SOLUTION INTRAMUSCULAR; INTRAVENOUS at 17:37

## 2019-12-30 RX ADMIN — Medication 10 ML: at 20:18

## 2019-12-30 RX ADMIN — PANTOPRAZOLE SODIUM 40 MG: 40 TABLET, DELAYED RELEASE ORAL at 08:00

## 2019-12-30 RX ADMIN — METHYLPREDNISOLONE SODIUM SUCCINATE 60 MG: 125 INJECTION, POWDER, FOR SOLUTION INTRAMUSCULAR; INTRAVENOUS at 23:26

## 2019-12-30 RX ADMIN — IPRATROPIUM BROMIDE AND ALBUTEROL SULFATE 3 ML: .5; 3 SOLUTION RESPIRATORY (INHALATION) at 10:58

## 2019-12-30 RX ADMIN — ACETAMINOPHEN 650 MG: 325 TABLET ORAL at 13:37

## 2019-12-30 RX ADMIN — IPRATROPIUM BROMIDE AND ALBUTEROL SULFATE 3 ML: .5; 3 SOLUTION RESPIRATORY (INHALATION) at 07:52

## 2019-12-30 RX ADMIN — BUDESONIDE 500 MCG: 0.5 INHALANT RESPIRATORY (INHALATION) at 10:58

## 2019-12-30 RX ADMIN — ALLOPURINOL 100 MG: 100 TABLET ORAL at 08:00

## 2019-12-30 RX ADMIN — ACETAMINOPHEN 650 MG: 325 TABLET ORAL at 08:00

## 2019-12-30 NOTE — PROGRESS NOTES
Bedside shift change report given to [address] (oncoming nurse) by Jose Chu  (offgoing nurse). Report included the following information SBAR, Kardex and MAR.

## 2019-12-30 NOTE — PROGRESS NOTES
12- CASE MANAGEMENT NOTE:  I met with the patient and her wife, Bozena Vazquez ( [phone]), to introduce myself and explain the role of case management. They normally live in an apartment with elevator access in [place], [place] but are currently here visiting the patient's son for the holiday. The patient is independent with her ADL's, uses no assistive devices and drives. The plan is to drive back to [facility] after discharge. She has prescription drug coverage and gets her medication from [facility] in [facility]. No discharge needs are anticipated.  Irish Tafoya, BSW, CM

## 2019-12-30 NOTE — PROGRESS NOTES
[facility]    Medical Progress Note      NAME: Charlotte Chapman   :  1950  MRM:  [id number]    Date/Time: 2019  10:43 AM          Assessment and Plan:     Influenza A / Abormal x-ray / Hypoxemia - Flu A positive. Started tamiflu. This explains CT findings. Other serologies and cx negative. Acute respiratory failure with hypoxia / Respiratory alkalosis / Dyspnea - Worsening overnight. Due to Flu. Increase from prednisone to solumedrol, add brovana pulmicort, check xray again, check ABG, consult pulmonary. continue prn duonebs. Check 6 minute walk prior to DC    Syncope / Hypotension - POA, vasovagal, due to Flu, and poor PO intake for 9 days. Hydrated. Check orthostatics. Multiple myeloma / Anemia - POA, just started chemo. Consulted oncology. Anemia worsened after hydration ans expected. Low TSH level - TSH 0.06. Normal T4 and T3. Unclear relationship to MM or chemo, but could be due to Flu. Dehydration / hyponatremia / hypokalemia - POA due to poor PO intake. Hydrate and replete lytes. Hyperglycemia - Noted on labs. No Hx of DM. May have DM. PCP to follow. Obese - Monitor weight during chemo    Gout - No symptoms on allopurinol    GERD (gastroesophageal reflux disease) - PPI    Hypoalbuminemia - Likely related to cancer, chemo and anorexia. Nutrition supplements and consult. Subjective:     Chief Complaint:  Feels worse, more dyspnea and wheezing    ROS:  (bold if positive, if negative)    SOB/DOETolerating some PT  Tolerating some Diet        Objective:     Last 24hrs VS reviewed since prior progress note.  Most recent are:    Visit Vitals  /75 (BP 1 Location: Left arm, BP Patient Position: At rest)   Pulse (!) 102   Temp 98.2 °F (36.8 °C)   Resp 21   Ht 5' 7\" (1.702 m)   Wt 93 kg (205 lb 0.4 oz)   SpO2 91%   BMI 32.11 kg/m²     SpO2 Readings from Last 6 Encounters:   19 91%    O2 Flow Rate (L/min): 3 l/min       Intake/Output Summary (Last 24 hours) at 12/30/2019 0845  Last data filed at 12/30/2019 9876  Gross per 24 hour   Intake 1190 ml   Output 1650 ml   Net -460 ml        Physical Exam:    Gen:  Well-developed, well-nourished, in no acute distress  HEENT:  Pink conjunctivae, PERRL, hearing intact to voice, moist mucous membranes  Neck:  Supple, without masses, thyroid non-tender  Resp:  No accessory muscle use, clear breath sounds without wheezes rales or rhonchi  Card:  No murmurs, tachycardic S1, S2 without thrills, bruits or peripheral edema  Abd:  Soft, non-tender, non-distended, normoactive bowel sounds are present, no mass  Lymph:  No cervical or inguinal adenopathy  Musc:  No cyanosis or clubbing  Skin:  No rashes or ulcers, skin turgor is good  Neuro:  Cranial nerves are grossly intact, mild motor weakness, follows commands appropriately  Psych:  Good insight, oriented to person, place and time, alert    Telemetry reviewed:   normal sinus rhythm  __________________________________________________________________  Medications Reviewed: (see below)  Medications:     Current Facility-Administered Medications   Medication Dose Route Frequency    arformoterol (BROVANA) neb solution 15 mcg  15 mcg Nebulization BID RT    budesonide (PULMICORT) 500 mcg/2 ml nebulizer suspension  500 mcg Nebulization BID RT    methylPREDNISolone (PF) (Solu-MEDROL) injection 125 mg  125 mg IntraVENous Q6H    albuterol-ipratropium (DUO-NEB) 2.5 MG-0.5 MG/3 ML  3 mL Nebulization Q4H PRN    sodium chloride (NS) flush 5-40 mL  5-40 mL IntraVENous Q8H    sodium chloride (NS) flush 5-40 mL  5-40 mL IntraVENous PRN    morphine injection 5 mg  5 mg IntraVENous Q4H PRN    acetaminophen (TYLENOL) tablet 650 mg  650 mg Oral Q4H PRN    diphenhydrAMINE (BENADRYL) capsule 25 mg  25 mg Oral Q4H PRN    ondansetron (ZOFRAN) injection 4 mg  4 mg IntraVENous Q4H PRN    enoxaparin (LOVENOX) injection 40 mg  40 mg SubCUTAneous Q24H    allopurinol (ZYLOPRIM) tablet 100 mg  100 mg Oral DAILY    aspirin delayed-release tablet 81 mg  81 mg Oral DAILY    pantoprazole (PROTONIX) tablet 40 mg  40 mg Oral ACB    oseltamivir (TAMIFLU) capsule 75 mg  75 mg Oral BID    sodium chloride (NS) flush 5-10 mL  5-10 mL IntraVENous PRN        Lab Data Reviewed: (see below)  Lab Review:     Recent Labs     12/29/19 0322 12/28/19 0527 12/27/19  1556   WBC 3.2* 3.9 6.4   HGB 8.9* 9.1* 10.0*   HCT 26.3* 26.7* 29.5*    207 231     Recent Labs     12/29/19 0322 12/28/19 0527 12/27/19  1556    135* 134*   K 3.9 3.2* 3.0*   * 106 102   CO2 21 19* 22   * 239* 151*   BUN 18 18 19   CREA 0.79 0.96 1.14*   CA 6.6* 6.6* 7.4*   MG 2.2  --   --    PHOS 1.9*  --   --    ALB 1.9* 2.1* 2.2*   TBILI 0.4 0.6 1.1*   SGOT 47* 43* 64*   ALT 50 53 60     No results found for: GLUCPOC  No results for input(s): PH, PCO2, PO2, HCO3, FIO2 in the last 72 hours. No results for input(s): INR, INREXT, INREXT in the last 72 hours.   All Micro Results     Procedure Component Value Units Date/Time    CULTURE, BLOOD [[id number]] Collected:  12/27/19 1627    Order Status:  Completed Specimen:  Blood Updated:  12/30/19 0443     Special Requests: NO SPECIAL REQUESTS        Culture result: NO GROWTH 3 DAYS       CULTURE, BLOOD [[id number]] Collected:  12/27/19 1556    Order Status:  Completed Specimen:  Blood Updated:  12/30/19 0443     Special Requests: NO SPECIAL REQUESTS        Culture result: NO GROWTH 3 DAYS       RESPIRATORY PANEL,PCR,NASOPHARYNGEAL [[id number]]  (Abnormal) Collected:  12/28/19 0835    Order Status:  Completed Specimen:  Nasopharyngeal Updated:  12/28/19 1311     Adenovirus NOT DETECTED        Coronavirus 229E NOT DETECTED        Coronavirus HKU1 NOT DETECTED        Coronavirus CVNL63 NOT DETECTED        Coronavirus OC43 NOT DETECTED        Metapneumovirus NOT DETECTED        Rhinovirus and Enterovirus NOT DETECTED        Influenza A NOT DETECTED        Influenza A, subtype H1 NOT DETECTED        Influenza A, subtype H3 NOT DETECTED        INFLUENZA A H1N1 PCR DETECTED        Influenza B NOT DETECTED        Parainfluenza 1 NOT DETECTED        Parainfluenza 2 NOT DETECTED        Parainfluenza 3 NOT DETECTED        Parainfluenza virus 4 NOT DETECTED        RSV by PCR NOT DETECTED        B. parapertussis, PCR NOT DETECTED        Bordetella pertussis - PCR NOT DETECTED        Chlamydophila pneumoniae DNA, QL, PCR NOT DETECTED        Mycoplasma pneumoniae DNA, QL, PCR NOT DETECTED       URINE CULTURE HOLD SAMPLE [[id number]] Collected:  12/28/19 1030    Order Status:  Completed Specimen:  Urine from Serum Updated:  12/28/19 1047     Urine culture hold       URINE ON HOLD IN MICROBIOLOGY DEPT FOR 3 DAYS. IF UNPRESERVED URINE IS SUBMITTED, IT CANNOT BE USED FOR ADDITIONAL TESTING AFTER 24 HRS, RECOLLECTION WILL BE REQUIRED. SENA Pizarro, UR/CSF [[id number]] Collected:  12/28/19 1030    Order Status:  Completed Updated:  12/28/19 1046    LEGIONELLA PNEUMOPHILA AG, URINE [[id number]] Collected:  12/28/19 1030    Order Status:  Completed Specimen:  Urine Updated:  12/28/19 1045    ENTERIC BACTERIA PANEL, DNA [[id number]]     Order Status:  Canceled Specimen:  Stool           Other pertinent lab: none    Total time spent with patient: 39 Minutes I reviewed chart, notes, data and current medications in the medical record. I have examined and treated the patient at bedside during this period.                  Care Plan discussed with: Patient, Family, Care Manager, Nursing Staff, Consultant/Specialist and >50% of time spent in counseling and coordination of care    Discussed:  Care Plan and D/C Planning    Prophylaxis:  Lovenox and H2B/PPI    Disposition:  Home w/Family           ___________________________________________________    Attending Physician: Italia Winn MD

## 2019-12-30 NOTE — PROGRESS NOTES
Bedside shift change report given to Yamileth Kirk (oncoming nurse) by [employer] (offgoing nurse). Report included the following information SBAR, Kardex, MAR and Recent Results.

## 2019-12-30 NOTE — CONSULTS
[facility]  Pulmonary, Critical Care, and Sleep Medicine    Initial Patient Consult    Name: Raimundo Grullon MRN: [id number]   : 1950 Hospital: [facility]   Date: 2019        IMPRESSION:   · Acute resp failure  · Pneumonia/pleural effusions:  ECHO w normal fxn  · Influenza A  · Mult myeloma: on VRd      RECOMMENDATIONS:   · Agree that this could all be viral pneumonia but given immunocompromised status, will add levaquin for now  · Check legionella ag  · Follow cxr  · Scheduled nebs  · Agree with solumedrol, will decrease dose  · Cont to follow cultures     Subjective: This patient has been seen and evaluated at the request of Dr. Alesha Brown for pneumonia and resp failure. Patient is a 68 y.o. female who is visiting from [place] who presents with a 12 d h/o sob, cough, fever, diarrhea. Pt recently started chemotherapy for her MM. Pt was sick through [facility] and was admitted on . CT scan with multilobar perihilar alveolar/interstitial infiltrates with small brittny effusion. Pt still sob. + cough but not productive. Achy. Febrile to 100.2 overnight. PMH: MM  SHx: former smoker, stopped in 80s; lives in [place]  Fhx: neg for inheritable lung dz  ROS: 10 system ROS completed. Pertinent + as noted. Past Medical History:   Diagnosis Date    GERD (gastroesophageal reflux disease)     Gout     Multiple myeloma (HCC)       No past surgical history on file. Prior to Admission medications    Medication Sig Start Date End Date Taking? Authorizing Provider   acyclovir (ZOVIRAX) 400 mg tablet Take 400 mg by mouth two (2) times a day. Yes Provider, Historical   allopurinol (ZYLOPRIM) 100 mg tablet Take 100 mg by mouth daily. Yes Provider, Historical   aspirin delayed-release 81 mg tablet Take 81 mg by mouth daily. Yes Provider, Historical   dexAMETHasone (DECADRON) 4 mg tablet Take 20 mg by mouth every Monday.    Yes Provider, Historical   lenalidomide (REVLIMID) 25 mg cap Take 25 mg by mouth. Revlimid 25 mg PO daily for 2 weeks on followed by 1 week off   Yes Provider, Historical   omeprazole (PRILOSEC) 20 mg capsule Take 20 mg by mouth Daily (before breakfast). Yes Provider, Historical   calcium-vitamin D (OYSTER SHELL CALCIUM-VIT D3) 250-125 mg-unit tablet Take 1 Tab by mouth two (2) times a day. Yes Provider, Historical   denosumab (XGEVA) soln injection by SubCUTAneous route every month. Yes Provider, Historical   bortezomib (VELCADE INJECTION) by Injection route. Two times per week for 2 weeks followed by 1 week off   Yes Provider, Historical     Allergies   Allergen Reactions    Bactrim [Sulfamethoprim] Myalgia    Pcn [Penicillins] Swelling      Social History     Tobacco Use    Smoking status: Not on file   Substance Use Topics    Alcohol use: Not on file      No family history on file. Current Facility-Administered Medications   Medication Dose Route Frequency    arformoterol (BROVANA) neb solution 15 mcg  15 mcg Nebulization BID RT    budesonide (PULMICORT) 500 mcg/2 ml nebulizer suspension  500 mcg Nebulization BID RT    methylPREDNISolone (PF) (Solu-MEDROL) injection 125 mg  125 mg IntraVENous Q6H    sodium chloride (NS) flush 5-40 mL  5-40 mL IntraVENous Q8H    enoxaparin (LOVENOX) injection 40 mg  40 mg SubCUTAneous Q24H    allopurinol (ZYLOPRIM) tablet 100 mg  100 mg Oral DAILY    aspirin delayed-release tablet 81 mg  81 mg Oral DAILY    pantoprazole (PROTONIX) tablet 40 mg  40 mg Oral ACB    oseltamivir (TAMIFLU) capsule 75 mg  75 mg Oral BID       Review of Systems:  A comprehensive review of systems was negative except for that written in the HPI.     Objective:   Vital Signs:    Visit Vitals  /75 (BP 1 Location: Left arm, BP Patient Position: At rest)   Pulse (!) 102   Temp 98.2 °F (36.8 °C)   Resp 21   Ht 5' 7\" (1.702 m)   Wt 93 kg (205 lb 0.4 oz)   SpO2 91%   BMI 32.11 kg/m²       O2 Device: Nasal cannula   O2 Flow Rate (L/min): 3 l/min   Temp (24hrs), Av.3 °F (36.8 °C), Min:98.1 °F (36.7 °C), Max:98.7 °F (37.1 °C)       Intake/Output:   Last shift:      No intake/output data recorded. Last 3 shifts:  1901 -  0700  In: 9037 [P.O.:850; I.V.:2440]  Out: 3250 [Urine:3250]    Intake/Output Summary (Last 24 hours) at 2019 1000  Last data filed at 2019 0538  Gross per 24 hour   Intake 1070 ml   Output 1650 ml   Net -580 ml      Physical Exam:   General:  Alert, cooperative, no distress, appears stated age. Head:  Normocephalic, without obvious abnormality, atraumatic. Eyes:  Conjunctivae/corneas clear. PERRL, EOMs intact. Nose: Nares normal. Septum midline. Mucosa normal. No drainage or sinus tenderness. Throat: Lips, mucosa, and tongue normal. Teeth and gums normal.   Neck: Supple, symmetrical, trachea midline, no adenopathy, thyroid: no enlargment/tenderness/nodules, no carotid bruit and no JVD. Back:   Symmetric, no curvature. ROM normal.   Lungs:   Clear to auscultation bilaterally. Chest wall:  No tenderness or deformity. Heart:  Regular rate and rhythm, S1, S2 normal, no murmur, click, rub or gallop. Abdomen:   Soft, non-tender. Bowel sounds normal. No masses,  No organomegaly. Extremities: Extremities normal, atraumatic, no cyanosis or edema. Pulses: 2+ and symmetric all extremities. Skin: Skin color, texture, turgor normal. No rashes or lesions   Lymph nodes: Cervical, supraclavicular, and axillary nodes normal.   Neurologic: Grossly nonfocal     Data review:   No results found for this or any previous visit (from the past 24 hour(s)).     Imaging:  I have personally reviewed the patients radiographs and have reviewed the reports:  As noted        Katelynn Hess MD

## 2019-12-30 NOTE — PROGRESS NOTES
Patient is very short of breath at rest and on exertion. wheezing  Despite of jet neb treatment. Dr Katheren Goodpasture notified and he said she is fine. Patient will continue to be on frequent observation for any worsening condition.

## 2019-12-30 NOTE — PROGRESS NOTES
PHYSICAL THERAPY:Pt reported mobilizing to chair earlier today and pleasantly declined PT at this time. Pt encouraged to perform bed exercise. PT will continue to follow.

## 2019-12-31 ENCOUNTER — APPOINTMENT (OUTPATIENT)
Dept: GENERAL RADIOLOGY | Age: 69
DRG: 193 | End: 2019-12-31
Attending: INTERNAL MEDICINE
Payer: COMMERCIAL

## 2019-12-31 VITALS
SYSTOLIC BLOOD PRESSURE: 107 MMHG | HEART RATE: 82 BPM | WEIGHT: 205.03 LBS | TEMPERATURE: 97.6 F | OXYGEN SATURATION: 94 % | DIASTOLIC BLOOD PRESSURE: 69 MMHG | RESPIRATION RATE: 18 BRPM | HEIGHT: 67 IN | BODY MASS INDEX: 32.18 KG/M2

## 2019-12-31 LAB
ALBUMIN SERPL-MCNC: 1.8 G/DL (ref 3.5–5)
ALBUMIN/GLOB SERPL: 0.3 {RATIO} (ref 1.1–2.2)
ALP SERPL-CCNC: 114 U/L (ref 45–117)
ALT SERPL-CCNC: 61 U/L (ref 12–78)
ANION GAP SERPL CALC-SCNC: 6 MMOL/L (ref 5–15)
AST SERPL-CCNC: 51 U/L (ref 15–37)
BILIRUB SERPL-MCNC: 0.4 MG/DL (ref 0.2–1)
BUN SERPL-MCNC: 14 MG/DL (ref 6–20)
BUN/CREAT SERPL: 22 (ref 12–20)
CALCIUM SERPL-MCNC: 7 MG/DL (ref 8.5–10.1)
CHLORIDE SERPL-SCNC: 113 MMOL/L (ref 97–108)
CO2 SERPL-SCNC: 22 MMOL/L (ref 21–32)
CREAT SERPL-MCNC: 0.64 MG/DL (ref 0.55–1.02)
ERYTHROCYTE [DISTWIDTH] IN BLOOD BY AUTOMATED COUNT: 15.9 % (ref 11.5–14.5)
GLOBULIN SER CALC-MCNC: 6 G/DL (ref 2–4)
GLUCOSE SERPL-MCNC: 169 MG/DL (ref 65–100)
HCT VFR BLD AUTO: 28.4 % (ref 35–47)
HGB BLD-MCNC: 9.4 G/DL (ref 11.5–16)
L PNEUMO1 AG UR QL IA: NEGATIVE
L PNEUMO1 AG UR QL IA: NEGATIVE
MAGNESIUM SERPL-MCNC: 2.4 MG/DL (ref 1.6–2.4)
MCH RBC QN AUTO: 32 PG (ref 26–34)
MCHC RBC AUTO-ENTMCNC: 33.1 G/DL (ref 30–36.5)
MCV RBC AUTO: 96.6 FL (ref 80–99)
NRBC # BLD: 0 K/UL (ref 0–0.01)
NRBC BLD-RTO: 0 PER 100 WBC
PHOSPHATE SERPL-MCNC: 2.4 MG/DL (ref 2.6–4.7)
PLATELET # BLD AUTO: 273 K/UL (ref 150–400)
PMV BLD AUTO: 10 FL (ref 8.9–12.9)
POTASSIUM SERPL-SCNC: 4.1 MMOL/L (ref 3.5–5.1)
PROT SERPL-MCNC: 7.8 G/DL (ref 6.4–8.2)
RBC # BLD AUTO: 2.94 M/UL (ref 3.8–5.2)
SODIUM SERPL-SCNC: 141 MMOL/L (ref 136–145)
SPECIMEN SOURCE: NORMAL
SPECIMEN SOURCE: NORMAL
WBC # BLD AUTO: 1.6 K/UL (ref 3.6–11)

## 2019-12-31 PROCEDURE — 97530 THERAPEUTIC ACTIVITIES: CPT

## 2019-12-31 PROCEDURE — 83735 ASSAY OF MAGNESIUM: CPT

## 2019-12-31 PROCEDURE — 74011250637 HC RX REV CODE- 250/637: Performed by: INTERNAL MEDICINE

## 2019-12-31 PROCEDURE — 74011000250 HC RX REV CODE- 250: Performed by: INTERNAL MEDICINE

## 2019-12-31 PROCEDURE — 94640 AIRWAY INHALATION TREATMENT: CPT

## 2019-12-31 PROCEDURE — 74011250636 HC RX REV CODE- 250/636: Performed by: INTERNAL MEDICINE

## 2019-12-31 PROCEDURE — 97116 GAIT TRAINING THERAPY: CPT

## 2019-12-31 PROCEDURE — 36415 COLL VENOUS BLD VENIPUNCTURE: CPT

## 2019-12-31 PROCEDURE — 77010033678 HC OXYGEN DAILY

## 2019-12-31 PROCEDURE — 84100 ASSAY OF PHOSPHORUS: CPT

## 2019-12-31 PROCEDURE — 94760 N-INVAS EAR/PLS OXIMETRY 1: CPT

## 2019-12-31 PROCEDURE — 80053 COMPREHEN METABOLIC PANEL: CPT

## 2019-12-31 PROCEDURE — 85027 COMPLETE CBC AUTOMATED: CPT

## 2019-12-31 PROCEDURE — 71045 X-RAY EXAM CHEST 1 VIEW: CPT

## 2019-12-31 RX ORDER — OSELTAMIVIR PHOSPHATE 75 MG/1
75 CAPSULE ORAL 2 TIMES DAILY
Qty: 4 CAP | Refills: 0 | Status: SHIPPED | OUTPATIENT
Start: 2019-12-31 | End: 2020-01-02

## 2019-12-31 RX ORDER — FLUTICASONE PROPIONATE AND SALMETEROL 100; 50 UG/1; UG/1
1 POWDER RESPIRATORY (INHALATION) EVERY 12 HOURS
Qty: 1 INHALER | Refills: 0 | Status: SHIPPED | OUTPATIENT
Start: 2019-12-31 | End: 2020-01-10

## 2019-12-31 RX ORDER — LEVOFLOXACIN 750 MG/1
750 TABLET ORAL EVERY 24 HOURS
Qty: 3 TAB | Refills: 0 | Status: SHIPPED | OUTPATIENT
Start: 2019-12-31

## 2019-12-31 RX ORDER — PREDNISONE 10 MG/1
TABLET ORAL
Qty: 21 TAB | Refills: 0 | Status: SHIPPED | OUTPATIENT
Start: 2019-12-31

## 2019-12-31 RX ADMIN — OSELTAMIVIR PHOSPHATE 75 MG: 75 CAPSULE ORAL at 08:15

## 2019-12-31 RX ADMIN — METHYLPREDNISOLONE SODIUM SUCCINATE 60 MG: 125 INJECTION, POWDER, FOR SOLUTION INTRAMUSCULAR; INTRAVENOUS at 06:09

## 2019-12-31 RX ADMIN — ASPIRIN 81 MG: 81 TABLET, COATED ORAL at 08:15

## 2019-12-31 RX ADMIN — ENOXAPARIN SODIUM 40 MG: 40 INJECTION SUBCUTANEOUS at 08:15

## 2019-12-31 RX ADMIN — BUDESONIDE 500 MCG: 0.5 INHALANT RESPIRATORY (INHALATION) at 07:43

## 2019-12-31 RX ADMIN — ALLOPURINOL 100 MG: 100 TABLET ORAL at 08:15

## 2019-12-31 RX ADMIN — PANTOPRAZOLE SODIUM 40 MG: 40 TABLET, DELAYED RELEASE ORAL at 06:09

## 2019-12-31 RX ADMIN — ARFORMOTEROL TARTRATE 15 MCG: 15 SOLUTION RESPIRATORY (INHALATION) at 07:43

## 2019-12-31 RX ADMIN — METHYLPREDNISOLONE SODIUM SUCCINATE 60 MG: 125 INJECTION, POWDER, FOR SOLUTION INTRAMUSCULAR; INTRAVENOUS at 11:10

## 2019-12-31 RX ADMIN — LEVOFLOXACIN 750 MG: 5 INJECTION, SOLUTION INTRAVENOUS at 11:10

## 2019-12-31 RX ADMIN — Medication 10 ML: at 06:10

## 2019-12-31 NOTE — PROGRESS NOTES
[facility]    Medical Progress Note      NAME: Raimundo Grullon   :  1950  MRM:  [id number]    Date/Time: 2019  9:31 AM          Assessment and Plan:     Influenza A / Abormal x-ray / Hypoxemia - Flu A positive. Started tamiflu. This explains CT findings. Other serologies and cx negative. Acute respiratory failure with hypoxia / Respiratory alkalosis / Dyspnea - Better overnight. Due to Flu. Increase from prednisone to solumedrol, add brovana pulmicort. Unchanged xray again, resp alk ABG, consulted pulmonary. continue prn duonebs. Needs oxygen for discharge. Syncope / Hypotension - POA, vasovagal, due to Flu, and poor PO intake for 9 days. Hydrated. Normal orthostatics. Multiple myeloma / Anemia - POA, just started chemo. Consulted oncology. Anemia worsened after hydration ans expected. Low TSH level - TSH 0.06. Normal T4 and T3. Unclear relationship to MM or chemo, but could be due to Flu. Dehydration / hyponatremia / hypokalemia - POA due to poor PO intake. Hydrate and replete lytes. Hyperglycemia - Noted on labs. No Hx of DM. May have DM. PCP to follow. Obese - Monitor weight during chemo    Gout - No symptoms on allopurinol    GERD (gastroesophageal reflux disease) - PPI    Hypoalbuminemia - Likely related to cancer, chemo and anorexia. Nutrition supplements and consult. Subjective:     Chief Complaint:  Feels better, less dyspnea and wheezing    ROS:  (bold if positive, if negative)    SOB/DOETolerating some PT  Tolerating some Diet        Objective:     Last 24hrs VS reviewed since prior progress note.  Most recent are:    Visit Vitals  /80 (BP 1 Location: Left arm, BP Patient Position: At rest)   Pulse 74   Temp 97.8 °F (36.6 °C)   Resp 19   Ht 5' 7\" (1.702 m)   Wt 93 kg (205 lb 0.4 oz)   SpO2 92%   BMI 32.11 kg/m²     SpO2 Readings from Last 6 Encounters:   19 92%    O2 Flow Rate (L/min): 3 l/min       Intake/Output Summary (Last 24 hours) at 12/31/2019 0931  Last data filed at 12/31/2019 0413  Gross per 24 hour   Intake 420 ml   Output 1500 ml   Net -1080 ml        Physical Exam:    Gen:  Well-developed, well-nourished, in no acute distress  HEENT:  Pink conjunctivae, PERRL, hearing intact to voice, moist mucous membranes  Neck:  Supple, without masses, thyroid non-tender  Resp:  No accessory muscle use, clear breath sounds with wheezes  Card:  No murmurs, tachycardic S1, S2 without thrills, bruits or peripheral edema  Abd:  Soft, non-tender, non-distended, normoactive bowel sounds are present, no mass  Lymph:  No cervical or inguinal adenopathy  Musc:  No cyanosis or clubbing  Skin:  No rashes or ulcers, skin turgor is good  Neuro:  Cranial nerves are grossly intact, mild motor weakness, follows commands appropriately  Psych:  Good insight, oriented to person, place and time, alert    Telemetry reviewed:   normal sinus rhythm  __________________________________________________________________  Medications Reviewed: (see below)  Medications:     Current Facility-Administered Medications   Medication Dose Route Frequency    arformoterol (BROVANA) neb solution 15 mcg  15 mcg Nebulization BID RT    budesonide (PULMICORT) 500 mcg/2 ml nebulizer suspension  500 mcg Nebulization BID RT    levoFLOXacin (LEVAQUIN) 750 mg in D5W IVPB  750 mg IntraVENous Q24H    methylPREDNISolone (PF) (Solu-MEDROL) injection 60 mg  60 mg IntraVENous Q6H    albuterol-ipratropium (DUO-NEB) 2.5 MG-0.5 MG/3 ML  3 mL Nebulization Q4H PRN    sodium chloride (NS) flush 5-40 mL  5-40 mL IntraVENous Q8H    sodium chloride (NS) flush 5-40 mL  5-40 mL IntraVENous PRN    morphine injection 5 mg  5 mg IntraVENous Q4H PRN    acetaminophen (TYLENOL) tablet 650 mg  650 mg Oral Q4H PRN    diphenhydrAMINE (BENADRYL) capsule 25 mg  25 mg Oral Q4H PRN    ondansetron (ZOFRAN) injection 4 mg  4 mg IntraVENous Q4H PRN    enoxaparin (LOVENOX) injection 40 mg  40 mg SubCUTAneous Q24H    allopurinol (ZYLOPRIM) tablet 100 mg  100 mg Oral DAILY    aspirin delayed-release tablet 81 mg  81 mg Oral DAILY    pantoprazole (PROTONIX) tablet 40 mg  40 mg Oral ACB    oseltamivir (TAMIFLU) capsule 75 mg  75 mg Oral BID    sodium chloride (NS) flush 5-10 mL  5-10 mL IntraVENous PRN        Lab Data Reviewed: (see below)  Lab Review:     Recent Labs     12/31/19 0448 12/29/19  0322   WBC 1.6* 3.2*   HGB 9.4* 8.9*   HCT 28.4* 26.3*    253     Recent Labs     12/31/19 0448 12/29/19  0322    142   K 4.1 3.9   * 115*   CO2 22 21   * 125*   BUN 14 18   CREA 0.64 0.79   CA 7.0* 6.6*   MG 2.4 2.2   PHOS 2.4* 1.9*   ALB 1.8* 1.9*   TBILI 0.4 0.4   SGOT 51* 47*   ALT 61 50     Lab Results   Component Value Date/Time    Glucose (POC) 250 (H) 12/30/2019 04:22 PM     Recent Labs     12/30/19  1040   PH 7.47*   PCO2 28*   PO2 73*   HCO3 20*     No results for input(s): INR, INREXT, INREXT in the last 72 hours. All Micro Results     Procedure Component Value Units Date/Time    CULTURE, BLOOD [[id number]] Collected:  12/27/19 1627    Order Status:  Completed Specimen:  Blood Updated:  12/31/19 0502     Special Requests: NO SPECIAL REQUESTS        Culture result: NO GROWTH 4 DAYS       CULTURE, BLOOD [[id number]] Collected:  12/27/19 1556    Order Status:  Completed Specimen:  Blood Updated:  12/31/19 0502     Special Requests: NO SPECIAL REQUESTS        Culture result: NO GROWTH 4 DAYS       SENA San, UR/CSF [[id number]] Collected:  12/28/19 1030    Order Status:  Completed Specimen:  Miscellaneous sample Updated:  12/30/19 1235     Source URINE        Specimen Urine     Streptococcus pneumoniae Ag NEGATIVE         Fluid culture Not indicated. Organism ID Not indicated. Please note Comment        Comment: (NOTE)  [employer] standards require a culture to be  performed on CSF specimens submitted for bacterial antigen testing.   (CAP [id number]) Urine specimens will not be cultured. Performed At: [address] [id number]  Zena Pinto MD DW:[id number]         URINE CULTURE HOLD SAMPLE [[id number]] Collected:  12/30/19 1130    Order Status:  Completed Specimen:  Urine Updated:  12/30/19 1148     Urine culture hold       URINE ON HOLD IN MICROBIOLOGY DEPT FOR 3 DAYS. IF UNPRESERVED URINE IS SUBMITTED, IT CANNOT BE USED FOR ADDITIONAL TESTING AFTER 24 HRS, RECOLLECTION WILL BE REQUIRED. LEGIONELLA PNEUMOPHILA AG, URINE [[id number]] Collected:  12/30/19 1130    Order Status:  Completed Specimen:  Urine Updated:  12/30/19 1148    RESPIRATORY PANEL,PCR,NASOPHARYNGEAL [[id number]]  (Abnormal) Collected:  12/28/19 0835    Order Status:  Completed Specimen:  Nasopharyngeal Updated:  12/28/19 1311     Adenovirus NOT DETECTED        Coronavirus 229E NOT DETECTED        Coronavirus HKU1 NOT DETECTED        Coronavirus CVNL63 NOT DETECTED        Coronavirus OC43 NOT DETECTED        Metapneumovirus NOT DETECTED        Rhinovirus and Enterovirus NOT DETECTED        Influenza A NOT DETECTED        Influenza A, subtype H1 NOT DETECTED        Influenza A, subtype H3 NOT DETECTED        INFLUENZA A H1N1 PCR DETECTED        Influenza B NOT DETECTED        Parainfluenza 1 NOT DETECTED        Parainfluenza 2 NOT DETECTED        Parainfluenza 3 NOT DETECTED        Parainfluenza virus 4 NOT DETECTED        RSV by PCR NOT DETECTED        B. parapertussis, PCR NOT DETECTED        Bordetella pertussis - PCR NOT DETECTED        Chlamydophila pneumoniae DNA, QL, PCR NOT DETECTED        Mycoplasma pneumoniae DNA, QL, PCR NOT DETECTED       URINE CULTURE HOLD SAMPLE [[id number]] Collected:  12/28/19 1030    Order Status:  Completed Specimen:  Urine from Serum Updated:  12/28/19 1047     Urine culture hold       URINE ON HOLD IN MICROBIOLOGY DEPT FOR 3 DAYS.  IF UNPRESERVED URINE IS SUBMITTED, IT CANNOT BE USED FOR ADDITIONAL TESTING AFTER 24 HRS, RECOLLECTION WILL BE REQUIRED. LEGIONELLA PNEUMOPHILA AG, URINE [[id number]] Collected:  12/28/19 1030    Order Status:  Completed Specimen:  Urine Updated:  12/28/19 1045    ENTERIC BACTERIA PANEL, DNA [[id number]]     Order Status:  Canceled Specimen:  Stool           Other pertinent lab: none    Total time spent with patient: 39 Minutes I reviewed chart, notes, data and current medications in the medical record. I have examined and treated the patient at bedside during this period.                  Care Plan discussed with: Patient, Family, Care Manager, Nursing Staff, Consultant/Specialist and >50% of time spent in counseling and coordination of care    Discussed:  Care Plan and D/C Planning    Prophylaxis:  Lovenox and H2B/PPI    Disposition:  Home w/Family           ___________________________________________________    Attending Physician: Vita Torres MD

## 2019-12-31 NOTE — PROGRESS NOTES
Physical Therapy completed oxygen assessment . Note in chart.     Thank You  Respiratory Care Department

## 2019-12-31 NOTE — PROGRESS NOTES
[facility]  Pulmonary, Critical Care, and Sleep Medicine    Initial Patient Consult    Name: Cole Henley MRN: [id number]   : 1950 Hospital: [facility]   Date: 2019        IMPRESSION:   · Acute resp failure  · Pneumonia/pleural effusions:  ECHO w normal fxn  · Influenza A  · Mult myeloma: on VRd      RECOMMENDATIONS:   · Supplemental O2 to keep sats 90%. Needs walking ox prior to discharge: will order today  · Agree that this could all be viral pneumonia but given immunocompromised status, but continue Levaquin for now  · Legionella pending  · Follow cxr  · Scheduled nebs  · Agree with solumedrol, will decrease dose today: will need discharge home with prednisone taper  · Cont to follow cultures    We will see Ms. Up as needed over the holiday and check back with her on Thursday if she is still here. Subjective: This patient has been seen and evaluated at the request of Dr. Shante Sanabria for pneumonia and resp failure. Patient is a 68 y.o. female who is visiting from [place] who presents with a 12 d h/o sob, cough, fever, diarrhea. Pt recently started chemotherapy for her MM. Pt was sick through [facility] and was admitted on . CT scan with multilobar perihilar alveolar/interstitial infiltrates with small brittny effusion. Pt still sob. + cough but not productive. Achy. Febrile to 100.2 overnight. PMH: MM  SHx: former smoker, stopped in 80s; lives in [place]  Fhx: neg for inheritable lung dz  ROS: 10 system ROS completed. Pertinent + as noted. Interval History  Afebrile  BP stable  O2 sats 92% on 3L NC  WBC 1.6  Hgb 9.4  S.pneumo negative    Has less wheezing today. Baltazar Pak to go home. Past Medical History:   Diagnosis Date    GERD (gastroesophageal reflux disease)     Gout     Multiple myeloma (HCC)       No past surgical history on file. Prior to Admission medications    Medication Sig Start Date End Date Taking?  Authorizing Provider oseltamivir (TAMIFLU) 75 mg capsule Take 1 Cap by mouth two (2) times a day for 2 days. 12/31/19 1/2/20 Yes Carl Leal MD   ipratropium-albuterol (COMBIVENT RESPIMAT)  mcg/actuation inhaler Take 1 Puff by inhalation every six (6) hours as needed for Wheezing for up to 10 days. 12/31/19 1/10/20 Yes Carl Leal MD   fluticasone propion-salmeterol (ADVAIR DISKUS) 100-50 mcg/dose diskus inhaler Take 1 Puff by inhalation every twelve (12) hours for 10 days. 12/31/19 1/10/20 Yes Carl Leal MD   predniSONE [facility] DS) 10 mg dose pack See administration instruction per 10mg dose pack 12/31/19  Yes Carl Leal MD   acyclovir (ZOVIRAX) 400 mg tablet Take 400 mg by mouth two (2) times a day. Yes Provider, Historical   allopurinol (ZYLOPRIM) 100 mg tablet Take 100 mg by mouth daily. Yes Provider, Historical   aspirin delayed-release 81 mg tablet Take 81 mg by mouth daily. Yes Provider, Historical   dexAMETHasone (DECADRON) 4 mg tablet Take 20 mg by mouth every Monday. Yes Provider, Historical   lenalidomide (REVLIMID) 25 mg cap Take 25 mg by mouth. Revlimid 25 mg PO daily for 2 weeks on followed by 1 week off   Yes Provider, Historical   omeprazole (PRILOSEC) 20 mg capsule Take 20 mg by mouth Daily (before breakfast). Yes Provider, Historical   calcium-vitamin D (OYSTER SHELL CALCIUM-VIT D3) 250-125 mg-unit tablet Take 1 Tab by mouth two (2) times a day. Yes Provider, Historical   denosumab (XGEVA) soln injection by SubCUTAneous route every month. Yes Provider, Historical   bortezomib (VELCADE INJECTION) by Injection route. Two times per week for 2 weeks followed by 1 week off   Yes Provider, Historical     Allergies   Allergen Reactions    Bactrim [Sulfamethoprim] Myalgia    Pcn [Penicillins] Swelling      Social History     Tobacco Use    Smoking status: Not on file   Substance Use Topics    Alcohol use: Not on file      No family history on file.      Current Facility-Administered Medications   Medication Dose Route Frequency    arformoterol (BROVANA) neb solution 15 mcg  15 mcg Nebulization BID RT    budesonide (PULMICORT) 500 mcg/2 ml nebulizer suspension  500 mcg Nebulization BID RT    levoFLOXacin (LEVAQUIN) 750 mg in D5W IVPB  750 mg IntraVENous Q24H    methylPREDNISolone (PF) (Solu-MEDROL) injection 60 mg  60 mg IntraVENous Q6H    sodium chloride (NS) flush 5-40 mL  5-40 mL IntraVENous Q8H    enoxaparin (LOVENOX) injection 40 mg  40 mg SubCUTAneous Q24H    allopurinol (ZYLOPRIM) tablet 100 mg  100 mg Oral DAILY    aspirin delayed-release tablet 81 mg  81 mg Oral DAILY    pantoprazole (PROTONIX) tablet 40 mg  40 mg Oral ACB    oseltamivir (TAMIFLU) capsule 75 mg  75 mg Oral BID       Review of Systems:  A comprehensive review of systems was negative except for that written in the HPI. Objective:   Vital Signs:    Visit Vitals  /80 (BP 1 Location: Left arm, BP Patient Position: At rest)   Pulse 74   Temp 97.8 °F (36.6 °C)   Resp 19   Ht 5' 7\" (1.702 m)   Wt 93 kg (205 lb 0.4 oz)   SpO2 92%   BMI 32.11 kg/m²       O2 Device: Nasal cannula   O2 Flow Rate (L/min): 3 l/min   Temp (24hrs), Av.9 °F (36.6 °C), Min:97.8 °F (36.6 °C), Max:98 °F (36.7 °C)       Intake/Output:   Last shift:      No intake/output data recorded. Last 3 shifts:  1901 -  0700  In: 420 [P.O.:420]  Out: 3150 [Urine:3150]    Intake/Output Summary (Last 24 hours) at 2019 1045  Last data filed at 2019 0413  Gross per 24 hour   Intake 420 ml   Output 1500 ml   Net -1080 ml      Physical Exam:   General:  Alert, cooperative, no distress, appears stated age. Head:  Normocephalic, without obvious abnormality, atraumatic. Eyes:  Conjunctivae/corneas clear. Nose: Nares normal. Septum midline.  Mucosa normal.    Throat: Lips, mucosa, and tongue normal. Teeth and gums normal.   Neck: Supple, symmetrical, trachea midline, no adenopathy   Lungs:   Mild expiratory wheeze, decent air movement. Chest wall:  No tenderness or deformity. Heart:  Regular rate and rhythm, S1, S2 normal, no murmur, click, rub or gallop. Abdomen:   Soft, non-tender. Bowel sounds normal. No masses,  No organomegaly. Extremities: Extremities normal, atraumatic, no cyanosis or edema. Pulses: 2+ and symmetric all extremities. Skin: Skin color, texture, turgor normal. No rashes or lesions   Lymph nodes: Cervical, supraclavicular, and axillary nodes normal.   Neurologic: Grossly nonfocal     Data review:     Recent Results (from the past 24 hour(s))   URINE CULTURE HOLD SAMPLE    Collection Time: 12/30/19 11:30 AM   Result Value Ref Range    Urine culture hold        URINE ON HOLD IN MICROBIOLOGY DEPT FOR 3 DAYS. IF UNPRESERVED URINE IS SUBMITTED, IT CANNOT BE USED FOR ADDITIONAL TESTING AFTER 24 HRS, RECOLLECTION WILL BE REQUIRED.    GLUCOSE, POC    Collection Time: 12/30/19  4:22 PM   Result Value Ref Range    Glucose (POC) 250 (H) 65 - 100 mg/dL    Performed by [place] [facility] (PCT)    CBC W/O DIFF    Collection Time: 12/31/19  4:48 AM   Result Value Ref Range    WBC 1.6 (L) 3.6 - 11.0 K/uL    RBC 2.94 (L) 3.80 - 5.20 M/uL    HGB 9.4 (L) 11.5 - 16.0 g/dL    HCT 28.4 (L) 35.0 - 47.0 %    MCV 96.6 80.0 - 99.0 FL    MCH 32.0 26.0 - 34.0 PG    MCHC 33.1 30.0 - 36.5 g/dL    RDW 15.9 (H) 11.5 - 14.5 %    PLATELET 130 124 - 839 K/uL    MPV 10.0 8.9 - 12.9 FL    NRBC 0.0 0  WBC    ABSOLUTE NRBC 0.00 0.00 - 0.01 K/uL   MAGNESIUM    Collection Time: 12/31/19  4:48 AM   Result Value Ref Range    Magnesium 2.4 1.6 - 2.4 mg/dL   METABOLIC PANEL, COMPREHENSIVE    Collection Time: 12/31/19  4:48 AM   Result Value Ref Range    Sodium 141 136 - 145 mmol/L    Potassium 4.1 3.5 - 5.1 mmol/L    Chloride 113 (H) 97 - 108 mmol/L    CO2 22 21 - 32 mmol/L    Anion gap 6 5 - 15 mmol/L    Glucose 169 (H) 65 - 100 mg/dL    BUN 14 6 - 20 MG/DL    Creatinine 0.64 0.55 - 1.02 MG/DL    BUN/Creatinine ratio 22 (H) 12 - 20      GFR est AA >60 >60 ml/min/1.73m2    GFR est non-AA >60 >60 ml/min/1.73m2    Calcium 7.0 (L) 8.5 - 10.1 MG/DL    Bilirubin, total 0.4 0.2 - 1.0 MG/DL    ALT (SGPT) 61 12 - 78 U/L    AST (SGOT) 51 (H) 15 - 37 U/L    Alk.  phosphatase 114 45 - 117 U/L    Protein, total 7.8 6.4 - 8.2 g/dL    Albumin 1.8 (L) 3.5 - 5.0 g/dL    Globulin 6.0 (H) 2.0 - 4.0 g/dL    A-G Ratio 0.3 (L) 1.1 - 2.2     PHOSPHORUS    Collection Time: 12/31/19  4:48 AM   Result Value Ref Range    Phosphorus 2.4 (L) 2.6 - 4.7 MG/DL       Imaging:  I have personally reviewed the patients radiographs and have reviewed the reports:  As noted        Jeovanny Lares NP

## 2019-12-31 NOTE — PROGRESS NOTES
Nutrition Assessment:    RECOMMENDATIONS/INTERVENTION(S):   1. Continue regular diet. 2. Add Ensure Enlive 1x/d for pt to try to promote adequate energy/protein intake. 3 Continue to monitor intakes, wt changes, labs. ASSESSMENT:   12/31: Pt assessed for LOS. Admitted for syncope. Flu A positive. PMh includes GERD, gout, multiple myeloma on chemo x3 weeks. BMI 32.1, c/w overweight for age. Pt reports good appetite. Says she ate 1/2 her oatmeal and 100% of her muffin for breakfast. Recorded intakes all 25%. C/o altered taste and is trying to find foods that taste good on the menu. Says she tastes spicy and sweet foods better than others. Pt says she is adjusting to this \"new normal\" for appetite and taste. Denies any recent weight changes so far as she just started chemo 3 weeks ago. Agreeable to trying Ensure Enlive- will add 1x/d for pt to try to promote adequate intake and weight maintenance. Family in room says they are planning on having smoothies with protein powder added at home. No c/o N/V, but says she fluctuates between diarrhea/constipation. Labs- phos 2.4. Meds- solu-medrol, Protonix. Diet Order: Regular  % Eaten:    Patient Vitals for the past 72 hrs:   % Diet Eaten   12/29/19 1446 25 %   12/29/19 0935 25 %   12/28/19 1501 25 %   12/28/19 1458 25 %       Pertinent Medications: [x] Reviewed    Labs: [x] Reviewed    Anthropometrics: Height: 5' 7\" (170.2 cm) Weight: 93 kg (205 lb 0.4 oz)    IBW (%IBW):   ( ) UBW (%UBW):   (  %)      BMI: Body mass index is 32.11 kg/m². This BMI is indicative of:   [] Underweight    [] Normal    [x] Overweight for age    []  Obesity    []  Extreme Obesity (BMI>40)  Estimated Nutrition Needs (Based on): 1919 Kcals/day(1476 x 1/3 AF) , 111 g(1.2-1.5 g/kg) Protein  Carbohydrate:  At Least 130 g/day  Fluids: 1919 mL/day (1 ml/kcal)    Last BM: 12/30   [x]Active     []Hyperactive  []Hypoactive       [] Absent   BS  Skin:    [x] Intact   [] Incision  [] Breakdown [] DTI   [] Tears/Excoriation/Abrasion  []Edema [] Other:      Wt Readings from Last 30 Encounters:   12/28/19 93 kg (205 lb 0.4 oz)      NUTRITION DIAGNOSES:   Problem:  Increased nutrient needs     Etiology: related to increased demand for protein     Signs/Symptoms: as evidenced by multiple myeloma on chemo      NUTRITION INTERVENTIONS:  Meals/Snacks: General/healthful diet   Supplements: Commercial supplement              GOAL:   PO intake >50% meals + ONS meeting estimated protein needs next 1-3days    Cultural, Latter-day, or Ethnic Dietary Needs: None     EDUCATION & DISCHARGE NEEDS:    [x] None Identified   [] Identified and Education Provided/Documented   [] Identified and Pt declined/was not appropriate      [] Interdisciplinary Care Plan Reviewed/Documented    [x] Discharge Needs: Regular diet + ONS   [] No Nutrition Related Discharge Needs    NUTRITION RISK:   Pt Is At Nutrition Risk  [x]     No Nutrition Risk Identified  []       PT SEEN FOR:    []  MD Consult: []Calorie Count      []Diabetic Diet Education        []Diet Education     []Electrolyte Management     []General Nutrition Management and Supplements     []Management of Tube Feeding     []TPN Recommendations    []  RN Referral:  []MST score >=2     []Enteral/Parenteral Nutrition PTA     []Pregnant: Gestational DM or Multigestation                 [] Pressure Ulcer    []  Low BMI      [x]  Length of Stay       [] Dysphagia Diet         [] Ventilator  []  Follow-up     Previous Recommendations:   [] Implemented          [] Not Implemented          [x] Not Applicable    Previous Goal:   [] Met              [] Progressing Towards Goal              [] Not Progressing Towards Goal   [x] Not Applicable            [facility], [address] [place] [address]  Pager [phone]  Phone [phone]

## 2019-12-31 NOTE — DISCHARGE SUMMARY
Physician Discharge Summary     Patient ID:  Pushpa Ag  [id number]  49 y.o.  1950    Admit date: 12/27/2019    Discharge date and time: 12/31/2019    Admission Diagnoses: Bilateral pneumonia [J18.9]    Discharge Diagnoses:    Principal Diagnosis     Flu [address] [facility] Course and other diagoses  Influenza A / Abormal x-ray / Hypoxemia - Flu A positive. Started tamiflu. This explains CT findings. Other serologies and cx negative.       Acute respiratory failure with hypoxia / Respiratory alkalosis / Dyspnea - Better overnight. Due to Flu. Increase from prednisone to solumedrol, add brovana pulmicort. Unchanged xray again, resp alk ABG, consulted pulmonary. continue prn duonebs. Needs oxygen for discharge.     Syncope / Hypotension - POA, vasovagal, due to Flu, and poor PO intake for 9 days. Hydrated. Normal orthostatics.     Multiple myeloma / Anemia - POA, just started chemo. Consulted oncology. Anemia worsened after hydration ans expected.     Low TSH level - TSH 0.06. Normal T4 and T3. Unclear relationship to MM or chemo, but could be due to Flu.       Dehydration / hyponatremia / hypokalemia - POA due to poor PO intake. Hydrate and replete lytes.     Hyperglycemia - Noted on labs. No Hx of DM. May have DM. PCP to follow.     Obese - Monitor weight during chemo     Gout - No symptoms on allopurinol     GERD (gastroesophageal reflux disease) - PPI     Hypoalbuminemia - Likely related to cancer, chemo and anorexia. Nutrition supplements and consult.       PCP: Other, MD Ottoniel    Consults: Pulmonary/Intensive care and Hematology/Oncology    Significant Diagnostic Studies: See Hospital Course    Discharged home in improved condition.     Discharge Exam:  /80 (BP 1 Location: Left arm, BP Patient Position: At rest)   Pulse 74   Temp 97.8 °F (36.6 °C)   Resp 19   Ht 5' 7\" (1.702 m)   Wt 93 kg (205 lb 0.4 oz)   SpO2 92%   BMI 32.11 kg/m²      Gen:  Well-developed, well-nourished, in no acute distress  HEENT:  Pink conjunctivae, PERRL, hearing intact to voice, moist mucous membranes  Neck:  Supple, without masses, thyroid non-tender  Resp:  No accessory muscle use, clear breath sounds with wheezes  Card:  No murmurs, tachycardic S1, S2 without thrills, bruits or peripheral edema  Abd:  Soft, non-tender, non-distended, normoactive bowel sounds are present, no mass  Lymph:  No cervical or inguinal adenopathy  Musc:  No cyanosis or clubbing  Skin:  No rashes or ulcers, skin turgor is good  Neuro:  Cranial nerves are grossly intact, mild motor weakness, follows commands appropriately  Psych:  Good insight, oriented to person, place and time, alert    Patient Instructions:   Current Discharge Medication List      START taking these medications    Details   oseltamivir (TAMIFLU) 75 mg capsule Take 1 Cap by mouth two (2) times a day for 2 days. Qty: 4 Cap, Refills: 0      ipratropium-albuterol (COMBIVENT RESPIMAT)  mcg/actuation inhaler Take 1 Puff by inhalation every six (6) hours as needed for Wheezing for up to 10 days. Qty: 1 Inhaler, Refills: 0      fluticasone propion-salmeterol (ADVAIR DISKUS) 100-50 mcg/dose diskus inhaler Take 1 Puff by inhalation every twelve (12) hours for 10 days. Qty: 1 Inhaler, Refills: 0      predniSONE (STERAPRED DS) 10 mg dose pack See administration instruction per 10mg dose pack  Qty: 21 Tab, Refills: 0         CONTINUE these medications which have NOT CHANGED    Details   acyclovir (ZOVIRAX) 400 mg tablet Take 400 mg by mouth two (2) times a day. allopurinol (ZYLOPRIM) 100 mg tablet Take 100 mg by mouth daily. aspirin delayed-release 81 mg tablet Take 81 mg by mouth daily. dexAMETHasone (DECADRON) 4 mg tablet Take 20 mg by mouth every Monday. lenalidomide (REVLIMID) 25 mg cap Take 25 mg by mouth. Revlimid 25 mg PO daily for 2 weeks on followed by 1 week off      omeprazole (PRILOSEC) 20 mg capsule Take 20 mg by mouth Daily (before breakfast). calcium-vitamin D (OYSTER SHELL CALCIUM-VIT D3) 250-125 mg-unit tablet Take 1 Tab by mouth two (2) times a day. denosumab (XGEVA) soln injection by SubCUTAneous route every month. bortezomib (VELCADE INJECTION) by Injection route. Two times per week for 2 weeks followed by 1 week off           Activity: Activity as tolerated  Diet: Regular Diet, Increase noncaffeinated fluids and Encourage fluids  Wound Care: None needed    Follow-up with your PCP in 2 week.   Follow-up tests/labs - oxygen testing    Signed:  Rosette Le MD  12/31/2019  9:36 AM

## 2019-12-31 NOTE — PROGRESS NOTES
Discharge reviewed with patient and spouse, verbalize understanding. Peripheral IV removed. Prescriptions given to patient. Aware to follow up with PCP when returns home. Portable oxygen has been delivered.

## 2019-12-31 NOTE — PROGRESS NOTES
Bedside shift change report given to [employer], RN (oncoming nurse) by Shakeel Lindsey RN (offgoing nurse). Report included the following information SBAR and Kardex.

## 2019-12-31 NOTE — DISCHARGE INSTRUCTIONS
Patient Discharge Instructions    [facility] / [id number] : 1950    Admitted 2019 Discharged: 2019     Primary Diagnoses  Problem List as of 2019 Date Reviewed: 2019           Influenza   Anemia   * (Principal) Syncope   Abnormal x-ray   Obese   Hypotension   Hypoxemia   Multiple myeloma (HCC)   Gout   GERD (gastroesophageal reflux disease)   Hypoalbuminemia   Low TSH level          Take Home Medications     · It is important that you take the medication exactly as they are prescribed. · Keep your medication in the bottles provided by the pharmacist and keep a list of the medication names, dosages, and times to be taken in your wallet. · Do not take other medications without consulting your doctor. What to do at Home    Recommended diet: Regular Diet, Increase noncaffeinated fluids and Encourage fluids    Recommended activity: Activity as tolerated    If you experience worse breathing, please follow up with your PCP in [place]. Follow-up with your PCP in 2 days        Information obtained by :  I understand that if any problems occur once I am at home I am to contact my physician. I understand and acknowledge receipt of the instructions indicated above.                                                                                                                                            Physician's or R.N.'s Signature                                                                  Date/Time                                                                                                                                              Patient or Representative Signature                                                          Date/Time

## 2019-12-31 NOTE — PROGRESS NOTES
Bedside and Verbal shift change report given to Aura Camargo Rn (oncoming nurse) by  Namita Lozano (offgoing nurse). Report included the following information SBAR, Kardex, MAR, Accordion, Recent Results and Med Rec Status.

## 2019-12-31 NOTE — PROGRESS NOTES
Problem: Mobility Impaired (Adult and Pediatric)  Goal: *Therapy Goal (Edit Goal, Insert Text)  Description  FUNCTIONAL STATUS PRIOR TO ADMISSION: Patient was independent and active without use of DME.    HOME SUPPORT PRIOR TO ADMISSION: The patient lived with family members who provided support when needed. Physical Therapy Goals  Initiated 12/29/2019  1. Patient will move from supine to sit and sit to supine  in bed with supervision/set-up within 7 day(s). 2.  Patient will transfer from bed to chair and chair to bed with minimal assistance/contact guard assist using the least restrictive device within 7 day(s). 3.  Patient will perform sit to stand with supervision/set-up within 7 day(s). 4.  Patient will ambulate with minimal assistance/contact guard assist for 50 feet with the least restrictive device within 7 day(s). 5.  Patient will ascend/descend 5 stairs with bilateral handrail(s) with minimal assistance/contact guard assist within 7 day(s). Note:   PHYSICAL THERAPY TREATMENT  Patient: Tex Penn (73 y.o. female)  Date: 12/31/2019  Diagnosis: Bilateral pneumonia [J18.9]   Syncope       Precautions:    Chart, physical therapy assessment, plan of care and goals were reviewed. ASSESSMENT  Patient continues with skilled PT services. Pt seen for oximetry. Pt 87% on RA sitting up in bed. Pt was given 2L of O2 increasing SPO2 to 90% The pt isCGA to mobilize to EOB. Pt CGA sit to stand. Pts SPO2 dropped to 86% mobilizing out of bed to stand. On 2L. Pts SPO2 was increased to 3L of O2 standing with RW with SPO2 increasing to 88%. Pt ambulated 12ft to chair with RW CGA to min assist.Pts SPO2 was 89% on 3L post ambulation sitting. Pt fatigues quickly with mobility. Pt will need RW to stabilize gait. PLAN :  Patient continues to benefit from skilled intervention to address the above impairments. Continue treatment per established plan of care. to address goals.     Recommendation for discharge: (in order for the patient to meet his/her long term goals)  Physical therapy at least 2 days/week in the home AND ensure assist and/or supervision for safety     This discharge recommendation:  Has been made in collaboration with the attending provider and/or case management    IF patient discharges home will need the following DME: rolling walker       SUBJECTIVE:       OBJECTIVE DATA SUMMARY:   Critical Behavior:  Neurologic State: Alert, Eyes open spontaneously  Orientation Level: Oriented X4  Cognition: Follows commands     Functional Mobility Training:  Bed Mobility:      Pt CGA supine to sit              Transfers:  Sit to Stand: Contact guard assistance  Stand to Sit: Contact guard assistance                             Balance:  Sitting: High guard  Standing: With support  Ambulation/Gait Training:  Distance (ft): 14 Feet (ft)  Assistive Device: Gait belt;Walker, rolling  Ambulation - Level of Assistance: Contact guard assistance;Minimal assistance        Gait Abnormalities: Decreased step clearance        Base of Support: Narrowed     Speed/Cynthia: Pace decreased (<100 feet/min)  Step Length: Right shortened;Left shortened         Documentation for home O2:     ROOM AIR    AT REST   O2 SATS  87%    ROOM AIR WITH ACTIVITY 02 SATS  N/A    (  3 ) LITERS OF O2 WITH ACTIVITY O2 SATS  88%    (3    )LITERS OF 02 PATIENT LEFT COMFORTABLY  SITTING/SUPINE 02 SATS  89%            Activity Tolerance:   Fair  Please refer to the flowsheet for vital signs taken during this treatment.     After treatment patient left in no apparent distress:   Sitting in chair    COMMUNICATION/COLLABORATION:   The patients plan of care was discussed with: Physical Therapist    Jeremías Mccollum PTA   Time Calculation: 23 mins

## 2019-12-31 NOTE — PROGRESS NOTES
Problem: Falls - Risk of  Goal: *Absence of Falls  Description  Document Cheryl Mathis Fall Risk and appropriate interventions in the flowsheet. Outcome: Progressing Towards Goal  Note: Fall Risk Interventions:  Mobility Interventions: Communicate number of staff needed for ambulation/transfer, Patient to call before getting OOB         Medication Interventions: Teach patient to arise slowly    Elimination Interventions: Call light in reach, Patient to call for help with toileting needs, Stay With Me (per policy)    History of Falls Interventions:  Investigate reason for fall, Door open when patient unattended

## 2019-12-31 NOTE — PROGRESS NOTES
12- CASE MANAGEMENT NOTE:  The patient has agreed to return to her son's local home while needing oxygen and discharge is scheduled for today. She signed a choice letter for [employer] for a rolling walker and [employer] for oxygen (temporary) and has agreed to pay privately for the oxygen as her diagnosis does not allow Medicare to cover. Referrals were sent thru [employer], the RW was delivered from the [facility] closet and the portable oxygen will be delivered to the hospital and the concentrator to the son's home. The family will transport the patient home today.     Care Management Interventions  PCP Verified by CM: No  Mode of Transport at Discharge: (Family)  Transition of Care Consult (CM Consult): Discharge Planning  Discharge Durable Medical Equipment: Yes(Oxygen from [facility] and rolling walker from [employer])  Physical Therapy Consult: Yes  Occupational Therapy Consult: No  Speech Therapy Consult: No  Current Support Network: Lives with Spouse, Own Home  Confirm Follow Up Transport: Family  The Plan for Transition of Care is Related to the Following Treatment Goals : Bilateral Pneumonia  The Patient and/or Patient Representative was Provided with a Choice of Provider and Agrees with the Discharge Plan?: (S) Yes  Freedom of Choice List was Provided with Basic Dialogue that Supports the Patient's Individualized Plan of Care/Goals, Treatment Preferences and Shares the Quality Data Associated with the Providers?: Yes  Discharge Location  Discharge Placement: (Home to son's with wife)    REI Boyer, CM

## 2020-01-02 LAB
BACTERIA SPEC CULT: NORMAL
BACTERIA SPEC CULT: NORMAL
SERVICE CMNT-IMP: NORMAL
SERVICE CMNT-IMP: NORMAL

## 2022-03-18 PROBLEM — E66.9 OBESE: Status: ACTIVE | Noted: 2019-12-28

## 2022-03-18 PROBLEM — J11.1 INFLUENZA: Status: ACTIVE | Noted: 2019-12-29

## 2022-03-19 PROBLEM — R09.02 HYPOXEMIA: Status: ACTIVE | Noted: 2019-12-28

## 2022-03-19 PROBLEM — D64.9 ANEMIA: Status: ACTIVE | Noted: 2019-12-28

## 2022-03-19 PROBLEM — E88.09 HYPOALBUMINEMIA: Status: ACTIVE | Noted: 2019-12-28

## 2022-03-19 PROBLEM — I95.9 HYPOTENSION: Status: ACTIVE | Noted: 2019-12-28

## 2022-03-19 PROBLEM — R55 SYNCOPE: Status: ACTIVE | Noted: 2019-12-28

## 2022-03-20 PROBLEM — R93.89 ABNORMAL X-RAY: Status: ACTIVE | Noted: 2019-12-28

## 2022-03-20 PROBLEM — R79.89 LOW TSH LEVEL: Status: ACTIVE | Noted: 2019-12-28

## 2023-03-04 ENCOUNTER — APPOINTMENT (OUTPATIENT)
Dept: GENERAL RADIOLOGY | Age: 73
End: 2023-03-04
Attending: EMERGENCY MEDICINE
Payer: MEDICARE

## 2023-03-04 ENCOUNTER — HOSPITAL ENCOUNTER (EMERGENCY)
Age: 73
Discharge: HOME | End: 2023-03-04
Attending: EMERGENCY MEDICINE
Payer: MEDICARE

## 2023-03-04 VITALS
SYSTOLIC BLOOD PRESSURE: 123 MMHG | BODY MASS INDEX: 29.71 KG/M2 | TEMPERATURE: 100.3 F | DIASTOLIC BLOOD PRESSURE: 78 MMHG | OXYGEN SATURATION: 98 % | HEIGHT: 64 IN | HEART RATE: 88 BPM | WEIGHT: 174 LBS | RESPIRATION RATE: 19 BRPM

## 2023-03-04 DIAGNOSIS — B34.9 VIRAL ILLNESS: ICD-10-CM

## 2023-03-04 DIAGNOSIS — N39.0 URINARY TRACT INFECTION WITHOUT HEMATURIA, SITE UNSPECIFIED: ICD-10-CM

## 2023-03-04 DIAGNOSIS — R50.9 FEVER IN ADULT: Primary | ICD-10-CM

## 2023-03-04 LAB
ALBUMIN SERPL-MCNC: 3 G/DL (ref 3.5–5)
ALBUMIN/GLOB SERPL: 0.7 (ref 1.1–2.2)
ALP SERPL-CCNC: 101 U/L (ref 45–117)
ALT SERPL-CCNC: 28 U/L (ref 12–78)
ANION GAP SERPL CALC-SCNC: 6 MMOL/L (ref 5–15)
APPEARANCE UR: ABNORMAL
AST SERPL-CCNC: 24 U/L (ref 15–37)
ATRIAL RATE: 102 BPM
BACTERIA URNS QL MICRO: ABNORMAL /HPF
BASOPHILS # BLD: 0 K/UL (ref 0–0.1)
BASOPHILS NFR BLD: 0 % (ref 0–1)
BILIRUB SERPL-MCNC: 1.8 MG/DL (ref 0.2–1)
BILIRUB UR QL: NEGATIVE
BNP SERPL-MCNC: 262 PG/ML
BUN SERPL-MCNC: 15 MG/DL (ref 6–20)
BUN/CREAT SERPL: 16 (ref 12–20)
CALCIUM SERPL-MCNC: 8.8 MG/DL (ref 8.5–10.1)
CALCULATED P AXIS, ECG09: 2 DEGREES
CALCULATED R AXIS, ECG10: -27 DEGREES
CALCULATED T AXIS, ECG11: 35 DEGREES
CHLORIDE SERPL-SCNC: 106 MMOL/L (ref 97–108)
CO2 SERPL-SCNC: 24 MMOL/L (ref 21–32)
COLOR UR: ABNORMAL
COMMENT, HOLDF: NORMAL
CREAT SERPL-MCNC: 0.96 MG/DL (ref 0.55–1.02)
DIAGNOSIS, 93000: NORMAL
DIFFERENTIAL METHOD BLD: ABNORMAL
EOSINOPHIL # BLD: 0 K/UL (ref 0–0.4)
EOSINOPHIL NFR BLD: 1 % (ref 0–7)
EPITH CASTS URNS QL MICRO: ABNORMAL /LPF
ERYTHROCYTE [DISTWIDTH] IN BLOOD BY AUTOMATED COUNT: 15.2 % (ref 11.5–14.5)
FLUAV AG NPH QL IA: NEGATIVE
FLUBV AG NOSE QL IA: NEGATIVE
GLOBULIN SER CALC-MCNC: 4.2 G/DL (ref 2–4)
GLUCOSE SERPL-MCNC: 151 MG/DL (ref 65–100)
GLUCOSE UR STRIP.AUTO-MCNC: NEGATIVE MG/DL
HCT VFR BLD AUTO: 33.4 % (ref 35–47)
HGB BLD-MCNC: 11.1 G/DL (ref 11.5–16)
HGB UR QL STRIP: ABNORMAL
IMM GRANULOCYTES # BLD AUTO: 0 K/UL (ref 0–0.04)
IMM GRANULOCYTES NFR BLD AUTO: 0 % (ref 0–0.5)
KETONES UR QL STRIP.AUTO: 40 MG/DL
LEUKOCYTE ESTERASE UR QL STRIP.AUTO: ABNORMAL
LYMPHOCYTES # BLD: 1.2 K/UL (ref 0.8–3.5)
LYMPHOCYTES NFR BLD: 16 % (ref 12–49)
MCH RBC QN AUTO: 31.4 PG (ref 26–34)
MCHC RBC AUTO-ENTMCNC: 33.2 G/DL (ref 30–36.5)
MCV RBC AUTO: 94.4 FL (ref 80–99)
MONOCYTES # BLD: 0.8 K/UL (ref 0–1)
MONOCYTES NFR BLD: 10 % (ref 5–13)
MUCOUS THREADS URNS QL MICRO: ABNORMAL /LPF
NEUTS SEG # BLD: 5.3 K/UL (ref 1.8–8)
NEUTS SEG NFR BLD: 73 % (ref 32–75)
NITRITE UR QL STRIP.AUTO: NEGATIVE
NRBC # BLD: 0 K/UL (ref 0–0.01)
NRBC BLD-RTO: 0 PER 100 WBC
P-R INTERVAL, ECG05: 156 MS
PH UR STRIP: 6 (ref 5–8)
PLATELET # BLD AUTO: 203 K/UL (ref 150–400)
PMV BLD AUTO: 9.7 FL (ref 8.9–12.9)
POTASSIUM SERPL-SCNC: 3.3 MMOL/L (ref 3.5–5.1)
PROT SERPL-MCNC: 7.2 G/DL (ref 6.4–8.2)
PROT UR STRIP-MCNC: 30 MG/DL
Q-T INTERVAL, ECG07: 338 MS
QRS DURATION, ECG06: 92 MS
QTC CALCULATION (BEZET), ECG08: 440 MS
RBC # BLD AUTO: 3.54 M/UL (ref 3.8–5.2)
RBC #/AREA URNS HPF: ABNORMAL /HPF (ref 0–5)
SAMPLES BEING HELD,HOLD: NORMAL
SARS-COV-2 RDRP RESP QL NAA+PROBE: NOT DETECTED
SODIUM SERPL-SCNC: 136 MMOL/L (ref 136–145)
SOURCE, COVRS: NORMAL
SP GR UR REFRACTOMETRY: 1.02 (ref 1–1.03)
TROPONIN I SERPL HS-MCNC: 6 NG/L (ref 0–51)
UR CULT HOLD, URHOLD: NORMAL
UROBILINOGEN UR QL STRIP.AUTO: 1 EU/DL (ref 0.2–1)
VENTRICULAR RATE, ECG03: 102 BPM
WBC # BLD AUTO: 7.4 K/UL (ref 3.6–11)
WBC URNS QL MICRO: ABNORMAL /HPF (ref 0–4)

## 2023-03-04 PROCEDURE — 74011000250 HC RX REV CODE- 250: Performed by: EMERGENCY MEDICINE

## 2023-03-04 PROCEDURE — 83880 ASSAY OF NATRIURETIC PEPTIDE: CPT

## 2023-03-04 PROCEDURE — 87804 INFLUENZA ASSAY W/OPTIC: CPT

## 2023-03-04 PROCEDURE — 93005 ELECTROCARDIOGRAM TRACING: CPT

## 2023-03-04 PROCEDURE — 80053 COMPREHEN METABOLIC PANEL: CPT

## 2023-03-04 PROCEDURE — 84484 ASSAY OF TROPONIN QUANT: CPT

## 2023-03-04 PROCEDURE — 71045 X-RAY EXAM CHEST 1 VIEW: CPT

## 2023-03-04 PROCEDURE — 36415 COLL VENOUS BLD VENIPUNCTURE: CPT

## 2023-03-04 PROCEDURE — 85025 COMPLETE CBC W/AUTO DIFF WBC: CPT

## 2023-03-04 PROCEDURE — 74011250636 HC RX REV CODE- 250/636: Performed by: EMERGENCY MEDICINE

## 2023-03-04 PROCEDURE — 96374 THER/PROPH/DIAG INJ IV PUSH: CPT

## 2023-03-04 PROCEDURE — 99285 EMERGENCY DEPT VISIT HI MDM: CPT

## 2023-03-04 PROCEDURE — 81001 URINALYSIS AUTO W/SCOPE: CPT

## 2023-03-04 PROCEDURE — 87635 SARS-COV-2 COVID-19 AMP PRB: CPT

## 2023-03-04 PROCEDURE — 87086 URINE CULTURE/COLONY COUNT: CPT

## 2023-03-04 PROCEDURE — 74011250637 HC RX REV CODE- 250/637: Performed by: EMERGENCY MEDICINE

## 2023-03-04 RX ORDER — CEFDINIR 300 MG/1
300 CAPSULE ORAL 2 TIMES DAILY
Qty: 14 CAPSULE | Refills: 0 | Status: SHIPPED | OUTPATIENT
Start: 2023-03-05 | End: 2023-03-12

## 2023-03-04 RX ORDER — ACETAMINOPHEN 500 MG
1000 TABLET ORAL
Qty: 20 TABLET | Refills: 0 | Status: SHIPPED | OUTPATIENT
Start: 2023-03-04

## 2023-03-04 RX ORDER — SODIUM CHLORIDE 0.9 % (FLUSH) 0.9 %
5-40 SYRINGE (ML) INJECTION EVERY 8 HOURS
Status: DISCONTINUED | OUTPATIENT
Start: 2023-03-04 | End: 2023-03-04 | Stop reason: HOSPADM

## 2023-03-04 RX ORDER — SODIUM CHLORIDE 0.9 % (FLUSH) 0.9 %
5-40 SYRINGE (ML) INJECTION AS NEEDED
Status: DISCONTINUED | OUTPATIENT
Start: 2023-03-04 | End: 2023-03-04 | Stop reason: HOSPADM

## 2023-03-04 RX ORDER — ACETAMINOPHEN 500 MG
1000 TABLET ORAL
Status: COMPLETED | OUTPATIENT
Start: 2023-03-04 | End: 2023-03-04

## 2023-03-04 RX ADMIN — ACETAMINOPHEN 1000 MG: 500 TABLET ORAL at 13:31

## 2023-03-04 RX ADMIN — SODIUM CHLORIDE, PRESERVATIVE FREE 10 ML: 5 INJECTION INTRAVENOUS at 13:28

## 2023-03-04 RX ADMIN — CEFTRIAXONE 1 G: 1 INJECTION, POWDER, FOR SOLUTION INTRAMUSCULAR; INTRAVENOUS at 16:40

## 2023-03-04 NOTE — ED TRIAGE NOTES
Pt here with 2 days H/O cough, fever, SOB. Pt demonstrated GILMAN with changing her clothes.  Ibuprofen last around 9 am.

## 2023-03-04 NOTE — ED PROVIDER NOTES
The history is provided by the patient. Fever   This is a new problem. The current episode started yesterday. The problem occurs constantly. The problem has not changed since onset. The maximum temperature noted was 101 - 101.9 F. Associated symptoms include cough and shortness of breath (with exertion). Pertinent negatives include no urinary symptoms. She has tried nothing for the symptoms. Cough  This is a new problem. The current episode started yesterday. The problem occurs constantly. The problem has not changed since onset. Associated symptoms include shortness of breath (with exertion). Shortness of Breath  Associated symptoms include a fever and cough. Past Medical History:   Diagnosis Date    GERD (gastroesophageal reflux disease)     Gout     Multiple myeloma (HCC)        No past surgical history on file. No family history on file. Social History     Socioeconomic History    Marital status:      Spouse name: Not on file    Number of children: Not on file    Years of education: Not on file    Highest education level: Not on file   Occupational History    Not on file   Tobacco Use    Smoking status: Not on file    Smokeless tobacco: Not on file   Substance and Sexual Activity    Alcohol use: Not on file    Drug use: Not on file    Sexual activity: Not on file   Other Topics Concern    Not on file   Social History Narrative    Not on file     Social Determinants of Health     Financial Resource Strain: Not on file   Food Insecurity: Not on file   Transportation Needs: Not on file   Physical Activity: Not on file   Stress: Not on file   Social Connections: Not on file   Intimate Partner Violence: Not on file   Housing Stability: Not on file         ALLERGIES: Bactrim [sulfamethoprim] and Pcn [penicillins]    Review of Systems   Constitutional:  Positive for fever. Respiratory:  Positive for cough and shortness of breath (with exertion).       Vitals:    03/04/23 1257 03/04/23 1304 03/04/23 1444 03/04/23 1500   BP:  114/70 123/78    Pulse: (!) 105 (!) 102 89 88   Resp:  20 19    Temp:  (!) 101 °F (38.3 °C) 100.3 °F (37.9 °C)    SpO2:  96% 98%    Weight:  78.9 kg (174 lb)     Height:  5' 4\" (1.626 m)              Physical Exam  Vitals and nursing note reviewed. Constitutional:       General: She is not in acute distress. Appearance: She is well-developed. HENT:      Head: Normocephalic and atraumatic. Eyes:      Conjunctiva/sclera: Conjunctivae normal.   Cardiovascular:      Rate and Rhythm: Normal rate and regular rhythm. Heart sounds: Normal heart sounds. Pulmonary:      Effort: Pulmonary effort is normal. No respiratory distress. Breath sounds: Normal breath sounds. Chest:      Chest wall: No tenderness. Abdominal:      General: There is no distension. Musculoskeletal:         General: No deformity. Normal range of motion. Cervical back: Neck supple. Skin:     General: Skin is warm and dry. Neurological:      Mental Status: She is alert. Cranial Nerves: No cranial nerve deficit. Psychiatric:         Behavior: Behavior normal.        Medical Decision Making  74 y.o. female presents with fever and cough starting yesterday. Now fatigued and feeling short of breath with minimal activity. DDx includes pneumonia, UTI, viral infection, atypical ACS or heart failure. Does not appear septic. Workup shows no obvious source with possible UTI on contaminated sample. Will cover empirically with ceftriaxone followed by course of omnicef pending culture. No edema or infection noted on CXR. No ischemia on EKG and negative troponin. Still suspect this could be viral illness and supportive care was discussed. Plan to follow up with PCP as needed and return precautions discussed for worsening or new concerning symptoms.      Problems Addressed:  Fever in adult: acute illness or injury  Urinary tract infection without hematuria, site unspecified: acute illness or injury  Viral illness: acute illness or injury    Amount and/or Complexity of Data Reviewed  Independent Historian: friend  Labs: ordered. Decision-making details documented in ED Course. Radiology: ordered and independent interpretation performed. Decision-making details documented in ED Course. ECG/medicine tests: ordered and independent interpretation performed. Decision-making details documented in ED Course. Risk  OTC drugs. Prescription drug management. ED Course as of 03/05/23 2035   Sat Mar 04, 2023   1405 EKG 1321: Rate 102, sinus tachycardia with occasional PVCs. Incomplete RBBB.   [DK]      ED Course User Index  [DK] Kristopher Nj MD       Procedures

## 2023-03-04 NOTE — ED NOTES
I have reviewed discharge instructions with the patient and spouse. The patient and spouse verbalized understanding. Patient and spouse left ambulatory from the ED.

## 2023-03-04 NOTE — ED NOTES
Assumed care of patient. Pt resting in position of comfort. Call bell within reach. Pt placed on monitor x 3. Pt reports 2 days of fever, cough, dyspnea. Took a home covid test which was negative. Pt has been traveling through several states recently. Pt updated on plan of care.  All needs addressed

## 2023-03-06 LAB
ATRIAL RATE: 102 BPM
BACTERIA SPEC CULT: NORMAL
CALCULATED P AXIS, ECG09: 2 DEGREES
CALCULATED R AXIS, ECG10: -27 DEGREES
CALCULATED T AXIS, ECG11: 35 DEGREES
DIAGNOSIS, 93000: NORMAL
P-R INTERVAL, ECG05: 156 MS
Q-T INTERVAL, ECG07: 338 MS
QRS DURATION, ECG06: 92 MS
QTC CALCULATION (BEZET), ECG08: 440 MS
SERVICE CMNT-IMP: NORMAL
VENTRICULAR RATE, ECG03: 102 BPM

## 2023-11-14 NOTE — ED PROVIDER NOTES
68 y.o. female with past medical history significant for multiple mylomas who presents from home via EMS with chief complaint of s/p syncopal episode. Patient reports for ~4 days she has been experiencing intermittent episodes of diarrhea, fevers, and congestion. Patient was using the bathroom PTA and suddenly became lightheaded and had an episode of syncope. Per EMS upon arrival patient was alert and oriented however still c/o lightheadedness. EMS reports en route patient's HR 88 bpm, , /80, temperature 100.6F,  and O2 sats were in low 90% endorsing patient was placed on 4L O2 which improved sats to 98%. Patient presents to [facility] ED c/o persisting lightheadedness. Patient notes she is being treated with chemotherapy and her last chemotherapy treatment was ~1 week ago. Patient denies hx of COPD. Patient denies vomiting, nausea, abdominal pain, SOB, chest pain, dysuria, and frequency. There are no other acute medical concerns at this time. Note written by Heike Merida, as dictated by Emiliano Graf MD 3:05 PM     The history is provided by the patient and the EMS personnel. No  was used. No past medical history on file. No past surgical history on file. No family history on file.     Social History     Socioeconomic History    Marital status: Not on file     Spouse name: Not on file    Number of children: Not on file    Years of education: Not on file    Highest education level: Not on file   Occupational History    Not on file   Social Needs    Financial resource strain: Not on file    Food insecurity:     Worry: Not on file     Inability: Not on file    Transportation needs:     Medical: Not on file     Non-medical: Not on file   Tobacco Use    Smoking status: Not on file   Substance and Sexual Activity    Alcohol use: Not on file    Drug use: Not on file    Sexual activity: Not on file   Lifestyle    Physical activity:     Days per week: Not Care Transitions Program Weekly Follow-up Call    Current Issues/Problems reviewed on call: no new issues.  Doing better.  Following her diet and fluid restriction.  Edema is resolved.  Denies SOB    Details of Interventions/Education completed on call: cont POC    Review of Systems:   Care Transition System Evaluation: weekly   Temp - Patient Reported :  (afebrile)  Fever: is not present   Pain:   0  General Symptoms: Fatigue  Neurologic/Neuromuscular Symptoms: WDL (absence of symptoms)  ENT Symptoms: WDL (absence of symptoms)  Respiratory Symptoms: WDL (absence of symptoms)  Cardiovascular Symptoms: WDL (absence of symptoms)  GI Symptoms: WDL (absence of symptoms)   Symptoms: Anuria  Skin Symptoms: WDL (absence of symptoms)   Sleeping Behaviors:Difficulty awakening  Current Lines/Drains:Yes    The patient is taking all medications as prescribed. The patient did not have questions or concerns regarding the medications prescribed.    Transitional Care Management (TCM) appointment was completed.  TCM appointment plan of care and upcoming appointments were reviewed with patient.  Transportation to upcoming appointments to be provided by transport van.    Next Care Transitions follow-up call will be within 1 week.    Plan for next follow-up call: general status.     on file     Minutes per session: Not on file    Stress: Not on file   Relationships    Social connections:     Talks on phone: Not on file     Gets together: Not on file     Attends Bahai service: Not on file     Active member of club or organization: Not on file     Attends meetings of clubs or organizations: Not on file     Relationship status: Not on file    Intimate partner violence:     Fear of current or ex partner: Not on file     Emotionally abused: Not on file     Physically abused: Not on file     Forced sexual activity: Not on file   Other Topics Concern    Not on file   Social History Narrative    Not on file         ALLERGIES: Patient has no allergy information on record. Review of Systems   Constitutional: Positive for fever. Negative for chills. HENT: Positive for congestion. Respiratory: Negative. Negative for cough and shortness of breath. Cardiovascular: Negative for chest pain. Gastrointestinal: Positive for diarrhea. Negative for abdominal pain, constipation, nausea and vomiting. Genitourinary: Negative. Negative for dysuria and frequency. Neurological: Positive for syncope and light-headedness. Negative for dizziness. All other systems reviewed and are negative. There were no vitals filed for this visit. Physical Exam  Vitals signs and nursing note reviewed. Constitutional:       Appearance: She is well-developed. HENT:      Head: Normocephalic and atraumatic. Eyes:      Pupils: Pupils are equal, round, and reactive to light. Neck:      Musculoskeletal: Normal range of motion and neck supple. Cardiovascular:      Rate and Rhythm: Regular rhythm. Tachycardia present. Heart sounds: Normal heart sounds. No murmur. Pulmonary:      Effort: Pulmonary effort is normal.      Comments: Course breath sounds bilaterally. Expiratory wheezing. Abdominal:      General: There is no distension. Palpations: Abdomen is soft. Tenderness:  There is no tenderness. There is no guarding or rebound. Musculoskeletal:      Comments: trace pitting edema bilaterally. Skin:     General: Skin is warm and dry. Capillary Refill: Capillary refill takes less than 2 seconds. Neurological:      Mental Status: She is alert and oriented to person, place, and time. Psychiatric:         Behavior: Behavior normal.     Note written by Heike Arce, as dictated by Rose Taylor MD 3:05 PM      MDM  Number of Diagnoses or Management Options  Hypoxemia: new and requires workup  Pneumonia of both lungs due to infectious organism, unspecified part of lung: new and requires workup  Diagnosis management comments: Patient presents with shortness of breath and hypoxemia in the setting of chemotherapy. Patient is not neutropenic she has evidence of pneumonia on her CAT scan. Patient admitted on supplemental oxygen, nebulizers, and Levaquin. Patient has no signs or symptoms of sepsis severe sepsis, septic shock, or neutropenic fever. Procedures  ED EKG interpretation:  Rhythm: normal sinus rhythm; and regular . Rate (approx.): 93 bpm; Axis: normal; ST/T wave: normal.  Note written by Janel Carrizales, as dictated by Rose Taylor MD 3:05 PM     PROGRESS NOTE:  7:22 PM  Provider notes pt is feeling worse and wheezing, she sat up and her respiratory rate increased to 35. Pneumonia is showing on chest CT. Provider went over pt's results with her and will give pt another breathing treatment and continue monitoring. Pt understands and agrees with plan. Patient is being admitted to the hospital.  The results of their tests and reasons for their admission have been discussed with them and/or available family. They convey agreement and understanding for the need to be admitted and for their admission diagnosis. Consultation will be made now with the inpatient physician for hospitalization.     Magi Kang Serve for Admission  8:48 PM    ED Room Number: ER06/06  Patient Name and age:  Pastor Reed 68 y.o.  female  Working Diagnosis:   1. Pneumonia of both lungs due to infectious organism, unspecified part of lung    2.  Hypoxemia      Readmission: no  Isolation Requirements:  no  Recommended Level of Care:  telemetry  Code Status:  Full Code  Department:[facility] - [phone]  Other:  On oncology